# Patient Record
Sex: MALE | Race: WHITE | NOT HISPANIC OR LATINO
[De-identification: names, ages, dates, MRNs, and addresses within clinical notes are randomized per-mention and may not be internally consistent; named-entity substitution may affect disease eponyms.]

---

## 2019-03-03 ENCOUNTER — RX RENEWAL (OUTPATIENT)
Age: 79
End: 2019-03-03

## 2019-05-01 ENCOUNTER — APPOINTMENT (OUTPATIENT)
Dept: INTERNAL MEDICINE | Facility: CLINIC | Age: 79
End: 2019-05-01
Payer: MEDICARE

## 2019-05-01 ENCOUNTER — NON-APPOINTMENT (OUTPATIENT)
Age: 79
End: 2019-05-01

## 2019-05-01 VITALS
DIASTOLIC BLOOD PRESSURE: 74 MMHG | HEIGHT: 69 IN | WEIGHT: 175 LBS | BODY MASS INDEX: 25.92 KG/M2 | RESPIRATION RATE: 16 BRPM | SYSTOLIC BLOOD PRESSURE: 126 MMHG

## 2019-05-01 DIAGNOSIS — Z86.79 PERSONAL HISTORY OF OTHER DISEASES OF THE CIRCULATORY SYSTEM: ICD-10-CM

## 2019-05-01 PROCEDURE — 93000 ELECTROCARDIOGRAM COMPLETE: CPT

## 2019-05-01 PROCEDURE — G0439: CPT

## 2019-05-01 PROCEDURE — 36415 COLL VENOUS BLD VENIPUNCTURE: CPT

## 2019-05-01 PROCEDURE — G0444 DEPRESSION SCREEN ANNUAL: CPT | Mod: 59

## 2019-05-01 RX ORDER — MULTIVIT-MIN/FOLIC/VIT K/LYCOP 400-300MCG
25 MCG TABLET ORAL
Refills: 0 | Status: ACTIVE | COMMUNITY

## 2019-05-01 RX ORDER — LEVALBUTEROL TARTRATE 45 UG/1
45 AEROSOL, METERED ORAL
Refills: 0 | Status: ACTIVE | COMMUNITY

## 2019-05-01 NOTE — HISTORY OF PRESENT ILLNESS
[de-identified] : or cpx--some leg edeam--seeing noted specialists--wants ptx for gait training--some intermittent leg edema

## 2019-05-01 NOTE — HEALTH RISK ASSESSMENT
[Very Good] : ~his/her~  mood as very good [No falls in past year] : Patient reported no falls in the past year [0] : 1) Little interest or pleasure doing things: Not at all (0) [With Significant Other] : lives with significant other [] :  [Fully functional (bathing, dressing, toileting, transferring, walking, feeding)] : Fully functional (bathing, dressing, toileting, transferring, walking, feeding) [Fully functional (using the telephone, shopping, preparing meals, housekeeping, doing laundry, using] : Fully functional and needs no help or supervision to perform IADLs (using the telephone, shopping, preparing meals, housekeeping, doing laundry, using transportation, managing medications and managing finances) [Reports normal functional visual acuity (ie: able to read med bottle)] : Reports normal functional visual acuity [] : No [de-identified] : pulm, neuro, derm [[id number]] : 0 [EyeExamDate] : 04/18 [Change in mental status noted] : No change in mental status noted [Reports changes in hearing] : Reports no changes in hearing [Reports changes in vision] : Reports no changes in vision [Reports changes in dental health] : Reports no changes in dental health

## 2019-05-01 NOTE — PLAN
[FreeTextEntry1] : neuro labs reviewed from 4/19\par supplemental labs\par venous compression stoickings\par 6 mo f/u

## 2019-05-01 NOTE — PHYSICAL EXAM

## 2019-05-07 LAB
APPEARANCE: CLEAR
BILIRUBIN URINE: NEGATIVE
BLOOD URINE: NEGATIVE
CHOLEST SERPL-MCNC: 231 MG/DL
CHOLEST/HDLC SERPL: 3.9 RATIO
COLOR: NORMAL
ESTIMATED AVERAGE GLUCOSE: 126 MG/DL
GLUCOSE QUALITATIVE U: NEGATIVE
HBA1C MFR BLD HPLC: 6 %
HDLC SERPL-MCNC: 60 MG/DL
KETONES URINE: NEGATIVE
LDLC SERPL CALC-MCNC: 147 MG/DL
LEUKOCYTE ESTERASE URINE: NEGATIVE
NITRITE URINE: NEGATIVE
PH URINE: 7
PROTEIN URINE: NEGATIVE
PSA SERPL-MCNC: 1.05 NG/ML
SPECIFIC GRAVITY URINE: 1.01
TRIGL SERPL-MCNC: 121 MG/DL
TSH SERPL-ACNC: 3.49 UIU/ML
UROBILINOGEN URINE: NORMAL

## 2019-08-05 ENCOUNTER — RX RENEWAL (OUTPATIENT)
Age: 79
End: 2019-08-05

## 2019-08-21 ENCOUNTER — RX RENEWAL (OUTPATIENT)
Age: 79
End: 2019-08-21

## 2019-09-09 ENCOUNTER — APPOINTMENT (OUTPATIENT)
Dept: INTERNAL MEDICINE | Facility: CLINIC | Age: 79
End: 2019-09-09
Payer: MEDICARE

## 2019-09-09 VITALS
OXYGEN SATURATION: 98 % | HEIGHT: 68 IN | RESPIRATION RATE: 16 BRPM | DIASTOLIC BLOOD PRESSURE: 72 MMHG | SYSTOLIC BLOOD PRESSURE: 124 MMHG | WEIGHT: 173 LBS | BODY MASS INDEX: 26.22 KG/M2

## 2019-09-09 PROCEDURE — 99213 OFFICE O/P EST LOW 20 MIN: CPT

## 2019-09-09 NOTE — PHYSICAL EXAM
[No Acute Distress] : no acute distress [Well Nourished] : well nourished [Well Developed] : well developed [Well-Appearing] : well-appearing [No Respiratory Distress] : no respiratory distress  [No Accessory Muscle Use] : no accessory muscle use [Clear to Auscultation] : lungs were clear to auscultation bilaterally [Normal Rate] : normal rate  [Regular Rhythm] : with a regular rhythm [Normal S1, S2] : normal S1 and S2 [No Murmur] : no murmur heard [No Carotid Bruits] : no carotid bruits [No Varicosities] : no varicosities [Pedal Pulses Present] : the pedal pulses are present [No Edema] : there was no peripheral edema [No Extremity Clubbing/Cyanosis] : no extremity clubbing/cyanosis [No Rash] : no rash [Coordination Grossly Intact] : coordination grossly intact [Normal Gait] : normal gait [No Focal Deficits] : no focal deficits [Deep Tendon Reflexes (DTR)] : deep tendon reflexes were 2+ and symmetric [Normal Affect] : the affect was normal [Normal Insight/Judgement] : insight and judgment were intact

## 2019-09-09 NOTE — HISTORY OF PRESENT ILLNESS
[Spouse] : spouse [FreeTextEntry1] : f/u HTN [de-identified] : 80 yo male bib wife with readings of high blood pressure over the weekend. pt wife states that readings were in the 150s/80s on every reading. On Saturday, she increased amlodipine to 5 mg BID and readings went down to 130/70s within 24 hours, denies any cp, palpitations, edema, ha, blurred vision

## 2019-12-02 ENCOUNTER — APPOINTMENT (OUTPATIENT)
Dept: INTERNAL MEDICINE | Facility: CLINIC | Age: 79
End: 2019-12-02
Payer: MEDICARE

## 2019-12-02 VITALS — SYSTOLIC BLOOD PRESSURE: 126 MMHG | DIASTOLIC BLOOD PRESSURE: 80 MMHG

## 2019-12-02 VITALS
RESPIRATION RATE: 18 BRPM | SYSTOLIC BLOOD PRESSURE: 160 MMHG | WEIGHT: 179 LBS | BODY MASS INDEX: 27.13 KG/M2 | DIASTOLIC BLOOD PRESSURE: 76 MMHG | HEIGHT: 68 IN

## 2019-12-02 PROCEDURE — 99214 OFFICE O/P EST MOD 30 MIN: CPT

## 2019-12-02 NOTE — REVIEW OF SYSTEMS
[Lower Ext Edema] : lower extremity edema [Negative] : Respiratory [Fever] : no fever [Chills] : no chills [Chest Pain] : no chest pain [Orthopena] : no orthopnea [Headache] : no headache [Fainting] : no fainting [Dizziness] : no dizziness

## 2019-12-02 NOTE — PHYSICAL EXAM
[No Acute Distress] : no acute distress [Normal] : normal rate, regular rhythm, normal S1 and S2 and no murmur heard [Nonpitting Edema] : nonpitting edema present

## 2019-12-02 NOTE — HISTORY OF PRESENT ILLNESS
[Spouse] : spouse [Hypertension] : Hypertension [Checks BP Sporadically] : The patient checks ~his/her~ blood pressure sporadically [120/80] : Target blood pressure is 120/80 [Target goal met] : BP target goal met [FreeTextEntry6] : 1. Seizure d/o: Stable; on AED\par 2. COPD: Controlled; no recent exacerbations\par Denies cough/congestion; UTD w/ flu vaccine\par 3. Venous insufficiency: Unable to tolerate compression stockings; notes slight worsening after increase in norvasc\par \par

## 2019-12-23 ENCOUNTER — NON-APPOINTMENT (OUTPATIENT)
Age: 79
End: 2019-12-23

## 2019-12-23 ENCOUNTER — APPOINTMENT (OUTPATIENT)
Dept: INTERNAL MEDICINE | Facility: CLINIC | Age: 79
End: 2019-12-23
Payer: MEDICARE

## 2019-12-23 VITALS
DIASTOLIC BLOOD PRESSURE: 68 MMHG | SYSTOLIC BLOOD PRESSURE: 164 MMHG | HEIGHT: 68 IN | OXYGEN SATURATION: 86 % | RESPIRATION RATE: 24 BRPM | BODY MASS INDEX: 27.13 KG/M2 | TEMPERATURE: 98.2 F | HEART RATE: 82 BPM | WEIGHT: 179 LBS

## 2019-12-23 VITALS — OXYGEN SATURATION: 95 %

## 2019-12-23 DIAGNOSIS — J44.1 CHRONIC OBSTRUCTIVE PULMONARY DISEASE WITH (ACUTE) EXACERBATION: ICD-10-CM

## 2019-12-23 DIAGNOSIS — R06.09 OTHER FORMS OF DYSPNEA: ICD-10-CM

## 2019-12-23 PROCEDURE — 99214 OFFICE O/P EST MOD 30 MIN: CPT | Mod: 25

## 2019-12-23 PROCEDURE — 94640 AIRWAY INHALATION TREATMENT: CPT

## 2019-12-23 PROCEDURE — 93000 ELECTROCARDIOGRAM COMPLETE: CPT

## 2019-12-23 NOTE — PLAN
[FreeTextEntry1] : Resolution of expiratory wheeze s/p neb tx\par Start steroid taper\par Advised if worsening dyspnea, CP or use of accessory muscle to seek immediate ER evaluation & if no improvement post steroids consider cardiology evaluation

## 2019-12-23 NOTE — HISTORY OF PRESENT ILLNESS
[Spouse] : spouse [FreeTextEntry8] : CC: Dyspnea on exertion\par \par Here w/ spouse who is providing majority of history.\par \par 78 y/o m. hx of COPD presenting w/ complaint of dyspnea on exertion.  States saw pulmonary 12/3, where O2 was 89%.  Last week they contacted his pulmonologist stating that he was having dyspnea with activity & cough and prescribed zpak.  His last dose was today. Wife notes improvement in cough. \par Per wife, does usually sleep with a few pillows but remains at his baseline. LE edema-chronic.\par  No associated fever, chills, or CP noted.\par

## 2019-12-23 NOTE — PHYSICAL EXAM
[No Acute Distress] : no acute distress [No Lymphadenopathy] : no lymphadenopathy [Exp Wheezing] : expiratory wheezing was heard [No Accessory Muscle Use] : no accessory muscle use [Normal] : normal rate, regular rhythm, normal S1 and S2 and no murmur heard [de-identified] : Chronic LE edema

## 2020-01-31 ENCOUNTER — APPOINTMENT (OUTPATIENT)
Dept: INTERNAL MEDICINE | Facility: CLINIC | Age: 80
End: 2020-01-31
Payer: MEDICARE

## 2020-01-31 VITALS
HEIGHT: 68 IN | HEART RATE: 74 BPM | WEIGHT: 170 LBS | OXYGEN SATURATION: 92 % | BODY MASS INDEX: 25.76 KG/M2 | TEMPERATURE: 94.5 F | RESPIRATION RATE: 18 BRPM

## 2020-01-31 VITALS — SYSTOLIC BLOOD PRESSURE: 120 MMHG | DIASTOLIC BLOOD PRESSURE: 76 MMHG

## 2020-01-31 PROCEDURE — 99214 OFFICE O/P EST MOD 30 MIN: CPT

## 2020-01-31 NOTE — HISTORY OF PRESENT ILLNESS
[FreeTextEntry1] : f/u hosp visit [Family Member] : family member [de-identified] : Admitted to [facility] (observation) with right arm weakness , aphasia and gen weakness-acute stroke ruled out --?seizure?--had brain imaging, swallow study

## 2020-02-10 ENCOUNTER — RX RENEWAL (OUTPATIENT)
Age: 80
End: 2020-02-10

## 2020-06-22 ENCOUNTER — RX RENEWAL (OUTPATIENT)
Age: 80
End: 2020-06-22

## 2020-07-22 ENCOUNTER — RX RENEWAL (OUTPATIENT)
Age: 80
End: 2020-07-22

## 2020-07-27 ENCOUNTER — APPOINTMENT (OUTPATIENT)
Dept: INTERNAL MEDICINE | Facility: CLINIC | Age: 80
End: 2020-07-27
Payer: MEDICARE

## 2020-07-27 VITALS
HEART RATE: 75 BPM | TEMPERATURE: 97.6 F | RESPIRATION RATE: 16 BRPM | WEIGHT: 179 LBS | BODY MASS INDEX: 27.13 KG/M2 | OXYGEN SATURATION: 89 % | HEIGHT: 68 IN

## 2020-07-27 VITALS — DIASTOLIC BLOOD PRESSURE: 78 MMHG | SYSTOLIC BLOOD PRESSURE: 126 MMHG

## 2020-07-27 PROCEDURE — 99214 OFFICE O/P EST MOD 30 MIN: CPT

## 2020-07-27 NOTE — HISTORY OF PRESENT ILLNESS
[Family Member] : family member [FreeTextEntry1] : f/u\par seen with wife [de-identified] : reviewed meds\par had labs (not on chart) and f/u by neuro 7/20\par no new neuro symps\par stable adl's

## 2020-07-28 ENCOUNTER — RX RENEWAL (OUTPATIENT)
Age: 80
End: 2020-07-28

## 2020-10-26 ENCOUNTER — RX RENEWAL (OUTPATIENT)
Age: 80
End: 2020-10-26

## 2020-11-09 ENCOUNTER — APPOINTMENT (OUTPATIENT)
Dept: INTERNAL MEDICINE | Facility: CLINIC | Age: 80
End: 2020-11-09
Payer: MEDICARE

## 2020-11-09 VITALS
TEMPERATURE: 97.7 F | HEART RATE: 82 BPM | WEIGHT: 180.25 LBS | HEIGHT: 68 IN | OXYGEN SATURATION: 89 % | BODY MASS INDEX: 27.32 KG/M2

## 2020-11-09 VITALS — DIASTOLIC BLOOD PRESSURE: 68 MMHG | SYSTOLIC BLOOD PRESSURE: 124 MMHG

## 2020-11-09 PROCEDURE — 99214 OFFICE O/P EST MOD 30 MIN: CPT

## 2020-11-09 NOTE — HISTORY OF PRESENT ILLNESS
[No Pertinent Cardiac History] : no history of aortic stenosis, atrial fibrillation, coronary artery disease, recent myocardial infarction, or implantable device/pacemaker [COPD] : COPD [No Adverse Anesthesia Reaction] : no adverse anesthesia reaction in self or family member [(Patient denies any chest pain, claudication, dyspnea on exertion, orthopnea, palpitations or syncope)] : Patient denies any chest pain, claudication, dyspnea on exertion, orthopnea, palpitations or syncope [Moderate (4-6 METs)] : Moderate (4-6 METs) [Chronic Anticoagulation] : no chronic anticoagulation [Chronic Kidney Disease] : no chronic kidney disease [Diabetes] : no diabetes [FreeTextEntry1] : cataract OD [FreeTextEntry2] : 11/17/20 [FreeTextEntry3] : Carmen [FreeTextEntry4] : mild mod copd with no severe symps

## 2020-11-09 NOTE — PHYSICAL EXAM
2019       Gerson Bhatti, [address] [facility] [id number] [facility] In Basket      Patient: Ian Lock   YOB: 1944   Date of Visit: 2019       Dear Dr. Charlotte Tristan: Thank you for referring Dayana Salmeron to me for evaluation. Below are my notes for this visit with him. If you have questions, please do not hesitate to call me. I look forward to following your patient along with you. Sincerely,        Marcia Ruby MD        CC: No Recipients  Marcia Ruby MD  2019 10:06 AM  Sign when Signing Visit  [address] OFFICE VISIT      Patient: Ian Lock Date of Service: 2019   : 1944      PCP: Gerson Bhatti MD     CHIEF COMPLAINT:    Chief Complaint   Patient presents with   â¢ Consultation     PSVT   â¢ Cardiomyopathy       HISTORY OF PRESENT ILLNESS   Ian Lock is a 66year old male with nonischemic cardiomyopathy, ESRD on HD, hx RUE DVT, COPD, DM seen in the office today for evaluation. He was recently seen by Dr. Eveline George and was noted to have SVT. He was sent to [facility] and rhythm terminated with adenosine. He had never previously had any episodes of SVT, and has not had any recurrence since that time. No meds were adjusted at that time. He has had a persistently depressed ejection fraction around 25-30% for greater than 3 months, confirmed on most recent echo 2019. He gets short of breath with mild exertion. Denies any orthopnea, PND, or lower extremity edema. REVIEW OF SYSTEMS   Constitutional: Negative for fatigue, change in activity level or change in weight. Skin: Negative for edema, pallor, rashes or open wounds. HEENT: Negative for visual changes, epistaxis or headaches. Respiratory: Negative for cough, orthopnea, paroxsymal nocturnal dyspnea, wheezing or shortness of breath with or without exertion.     Cardiovascular: Negative for exertional chest discomfort, chest pressure, palpitations or diaphoresis. Negative for lightheadedness or dizziness. Negative for near syncope or syncope. Negative for intermittent leg claudication. Gastrointestinal: Negative for abdominal pain. Genitourinary: Negative for hematuria. Extremities:  Negative for edema, petechiae or clubbing. Neuro:  Negative for lightheadedness, dizziness or syncope. Endocrine: Negative for weight loss or weight gain. Hematological: Negative for bleeding or brusing. Psych: Negative for change in affect, change in mentation or sleep disturbance. All other systems are reviewed and are negative except as documented in the HPI. HISTORIES     ALLERGIES:   Allergen Reactions   â¢ Seafood   (Food) RASH   â¢ Shellfish-Derived Products   (Food Or Med) RASH     Current Outpatient Medications   Medication Sig Dispense Refill   â¢ warfarin (COUMADIN) 5 MG tablet Indications: Blood Clot Take 1 tab on Tu/Th and 1.5 tabs (7.5MG) all other days (BLOOD THINNER) 115 tablet 2   â¢ albuterol (PROAIR HFA) 108 (90 Base) MCG/ACT inhaler Inhale 1 puff into the lungs every 4 to 6 hours as needed for Shortness of Breath or Wheezing. for shortness of breath or wheezing 1 Inhaler 2   â¢ metoPROLOL succinate (TOPROL-XL) 50 MG 24 hr tablet Take 1/2 tablet on dialysis days. Take 1 full tablet on non-dialysis days. To strengthen the heart and control the heart rate. 90 tablet 0   â¢ umeclidinium-vilanterol (ANORO ELLIPTA) 62.5-25 MCG/INH inhaler Inhale 1 puff into the lungs daily. Indications: Chronic Obstructive Lung Disease 1 each 5   â¢ lisinopril (ZESTRIL) 2.5 MG tablet Take 1 tablet by mouth daily. To strengthen the heart (heart failure). 90 tablet 0   â¢ rosuvastatin (CRESTOR) 5 MG tablet Take 1 tablet by mouth daily. Indications: coronary artery disease 90 tablet 3   â¢ traZODone (DESYREL) 50 MG tablet Take 0.5 tablets by mouth nightly. 30 tablet 5   â¢ sevelamer carbonate (RENVELA) 800 MG tablet Take 2,400 mg by mouth 3 times daily (with meals).  Indications: "High Amount of Phosphate in the Blood     â¢ methIMAzole (TAPAZOLE) 5 MG tablet Take 1 tablet by mouth daily. 90 tablet 1     No current facility-administered medications for this visit. History reviewed. No pertinent past medical history. Past Surgical History:   Procedure Laterality Date   â¢ Partial removal of kidney       Social History     Tobacco Use   â¢ Smoking status: Former Smoker     Packs/day: 0.00     Types: Cigarettes     Last attempt to quit: 2018     Years since quittin.2   â¢ Smokeless tobacco: Former User   Substance Use Topics   â¢ Alcohol use: No     Frequency: Never   â¢ Drug use: No     Family History   Family history unknown: Yes       I have reviewed the past medical history, family history, social history, medications and allergies listed in the medical record as obtained by my nursing staff and support staff and agree with their documentation. PHYSICAL EXAM   Vital Signs:    Vitals:    19 0929   BP: 120/62   Pulse: 78   Weight: 74.8 kg (165 lb)   Height: 5' 8"" (1.727 m)     General:   Alert, cooperative, conversive in no acute distress. Skin:  Warm and dry without rash. Head:  Normocephalic-atraumatic. Neck:  Trachea is midline. No adenopathy. Normal thyroid without mass or tenderness. Eyes:  Normal conjunctiva and sclera. Cardiovascular: regular rate and rhythm, S1, S2 normal, no murmur, click, rub or gallop  Respiratory: clear to auscultation bilaterally  Gastrointestinal:  Soft and nontender. Normal bowel sounds. No hepatomegaly or splenomegaly. Extremities:  extremities normal, atraumatic, no cyanosis or edema  Neuro:   No focal deficits  Psychiatric:   Cooperative. Appropriate mood & affect. LABORATORY DATA   All pertinent laboratory results were reviewed. DIAGNOSTIC IMAGING   All pertinent diagnostic imaging reviewed. EKG (visualized and independently interpreted): sinus rhythm, RBBB with  ms. Echo 19:  1. Left ventricle:  The cavity " size is mildly dilated. Wall thickness is     mildly increased. There is concentric hypertrophy. Systolic function is     moderately to severely reduced. The estimated ejection fraction is     25-30%, by visual assessment. Doppler parameters are consistent with     abnormal left ventricular relaxation (grade 1 diastolic dysfunction). 2. Mitral valve: Moderate regurgitation. 3. Left atrium: The atrium is mildly dilated. 4. Tricuspid valve: Moderate regurgitation. ASSESSMENT & PLAN     Huan Pantoja was seen today for consultation and cardiomyopathy. Diagnoses and all orders for this visit:    Nonischemic cardiomyopathy (CMS/HCC)  -     ELECTROCARDIOGRAM 12-LEAD; Future    PSVT (paroxysmal supraventricular tachycardia) (CMS/HCC)  -     ELECTROCARDIOGRAM 12-LEAD; Future    Coronary artery disease involving native coronary artery of native heart without angina pectoris    Internal jugular (IJ) vein thromboembolism, chronic, right (CMS/HCC)    ESRD (end stage renal disease) on dialysis (CMS/HCC)    Thyroid goiter    Chronic obstructive pulmonary disease, unspecified COPD type (CMS/HCC)    Patient with nonischemic cardiomyopathy, EF 25-30%, NYHA III functional status with QRS duration over 150 ms. Meets indications for biv-icd implant for primary prevention of sudden cardiac death and resynchronization therapy. Has AV fistula on left and has had an HD catheter on the right in the past. Recommend patient undergo RUE venogram prior to prep in order to see if right subclavian is patent. If not, will have no choice but to implant on the left. The risks, benefits, alternatives to the procedure were discussed with the patient in detail. Risks include, but are not limited to: bleeding, infection, vascular injury, pneumothorax, cardiac perforation and tamponade, heart attack, stroke and death. The patient understands and would like to proceed. All questions were answered.  The [place] Shared Decision Aid Tool was used in our discussion and literature was provided. Has only had 1 episode of SVT, asymptomatic. Continue beta blocker for now. If he has recurrent SVT, then ablation would be recommended at that time. Return for biv implant. Instructions provided as documented in the after visit summary. The patient indicated understanding of the diagnosis and agreed with the plan of care. [Normal] : soft, non-tender, non-distended, no masses palpated, no HSM and normal bowel sounds

## 2021-01-18 ENCOUNTER — RX RENEWAL (OUTPATIENT)
Age: 81
End: 2021-01-18

## 2021-02-11 ENCOUNTER — RX RENEWAL (OUTPATIENT)
Age: 81
End: 2021-02-11

## 2021-03-23 ENCOUNTER — APPOINTMENT (OUTPATIENT)
Dept: INTERNAL MEDICINE | Facility: CLINIC | Age: 81
End: 2021-03-23
Payer: MEDICARE

## 2021-03-23 ENCOUNTER — NON-APPOINTMENT (OUTPATIENT)
Age: 81
End: 2021-03-23

## 2021-03-23 VITALS
HEIGHT: 68 IN | HEART RATE: 69 BPM | SYSTOLIC BLOOD PRESSURE: 130 MMHG | RESPIRATION RATE: 17 BRPM | WEIGHT: 175 LBS | TEMPERATURE: 97.8 F | OXYGEN SATURATION: 97 % | BODY MASS INDEX: 26.52 KG/M2 | DIASTOLIC BLOOD PRESSURE: 80 MMHG

## 2021-03-23 DIAGNOSIS — Z20.822 CONTACT WITH AND (SUSPECTED) EXPOSURE TO COVID-19: ICD-10-CM

## 2021-03-23 PROCEDURE — 36415 COLL VENOUS BLD VENIPUNCTURE: CPT

## 2021-03-23 PROCEDURE — 99213 OFFICE O/P EST LOW 20 MIN: CPT | Mod: CS,25

## 2021-03-23 PROCEDURE — G0439: CPT

## 2021-03-23 PROCEDURE — 82270 OCCULT BLOOD FECES: CPT

## 2021-03-23 PROCEDURE — 93000 ELECTROCARDIOGRAM COMPLETE: CPT | Mod: 59

## 2021-03-23 NOTE — ASSESSMENT
[FreeTextEntry1] : specialty f/u\par labs/ecg\par I advised the pt to get the Covid vaccine\par 6 months\par wife requests covid ab be done

## 2021-03-23 NOTE — HISTORY OF PRESENT ILLNESS
[Family Member] : family member [FreeTextEntry1] : cpx [de-identified] : cpx--seen with wife\par seeing pulm (Saturnino), Neuro (Radha), Rheum (Ana)--stable pulm and neuro symps--no seizure\par reviewed meds

## 2021-03-23 NOTE — HEALTH RISK ASSESSMENT
[0] : 2) Feeling down, depressed, or hopeless: Not at all (0) [With Significant Other] : lives with significant other [] :  [Fully functional (bathing, dressing, toileting, transferring, walking, feeding)] : Fully functional (bathing, dressing, toileting, transferring, walking, feeding) [Fully functional (using the telephone, shopping, preparing meals, housekeeping, doing laundry, using] : Fully functional and needs no help or supervision to perform IADLs (using the telephone, shopping, preparing meals, housekeeping, doing laundry, using transportation, managing medications and managing finances) [With Patient/Caregiver] : With Patient/Caregiver [[id number]] : 0 [AdvancecareDate] : 3/21

## 2021-03-23 NOTE — PHYSICAL EXAM
[No Acute Distress] : no acute distress [Well Nourished] : well nourished [Well Developed] : well developed [Well-Appearing] : well-appearing [Normal Sclera/Conjunctiva] : normal sclera/conjunctiva [PERRL] : pupils equal round and reactive to light [EOMI] : extraocular movements intact [Normal Outer Ear/Nose] : the outer ears and nose were normal in appearance [Normal Oropharynx] : the oropharynx was normal [No JVD] : no jugular venous distention [No Lymphadenopathy] : no lymphadenopathy [Supple] : supple [Thyroid Normal, No Nodules] : the thyroid was normal and there were no nodules present [No Respiratory Distress] : no respiratory distress  [No Accessory Muscle Use] : no accessory muscle use [Clear to Auscultation] : lungs were clear to auscultation bilaterally [Normal Rate] : normal rate  [Regular Rhythm] : with a regular rhythm [Normal S1, S2] : normal S1 and S2 [No Murmur] : no murmur heard [No Carotid Bruits] : no carotid bruits [No Abdominal Bruit] : a ~M bruit was not heard ~T in the abdomen [No Varicosities] : no varicosities [Pedal Pulses Present] : the pedal pulses are present [No Edema] : there was no peripheral edema [No Palpable Aorta] : no palpable aorta [No Extremity Clubbing/Cyanosis] : no extremity clubbing/cyanosis [Soft] : abdomen soft [Non Tender] : non-tender [Non-distended] : non-distended [No Masses] : no abdominal mass palpated [No HSM] : no HSM [Normal Bowel Sounds] : normal bowel sounds [Normal Sphincter Tone] : normal sphincter tone [No Mass] : no mass [Prostate Enlargement] : the prostate was not enlarged [Normal Posterior Cervical Nodes] : no posterior cervical lymphadenopathy [Normal Anterior Cervical Nodes] : no anterior cervical lymphadenopathy [No CVA Tenderness] : no CVA  tenderness [No Spinal Tenderness] : no spinal tenderness [No Joint Swelling] : no joint swelling [Grossly Normal Strength/Tone] : grossly normal strength/tone [No Rash] : no rash [Coordination Grossly Intact] : coordination grossly intact [No Focal Deficits] : no focal deficits [Normal Gait] : normal gait [Deep Tendon Reflexes (DTR)] : deep tendon reflexes were 2+ and symmetric [Normal Affect] : the affect was normal [Normal Insight/Judgement] : insight and judgment were intact [Stool Occult Blood] : stool negative for occult blood

## 2021-03-24 LAB
25(OH)D3 SERPL-MCNC: 48.4 NG/ML
ALBUMIN SERPL ELPH-MCNC: 4.6 G/DL
ALP BLD-CCNC: 69 U/L
ALT SERPL-CCNC: 21 U/L
ANION GAP SERPL CALC-SCNC: 14 MMOL/L
APPEARANCE: CLEAR
AST SERPL-CCNC: 17 U/L
BACTERIA: NEGATIVE
BASOPHILS # BLD AUTO: 0.07 K/UL
BASOPHILS NFR BLD AUTO: 1.1 %
BILIRUB SERPL-MCNC: 0.5 MG/DL
BILIRUBIN URINE: NEGATIVE
BLOOD URINE: NEGATIVE
BUN SERPL-MCNC: 14 MG/DL
CALCIUM SERPL-MCNC: 9.4 MG/DL
CHLORIDE SERPL-SCNC: 101 MMOL/L
CHOLEST SERPL-MCNC: 251 MG/DL
CO2 SERPL-SCNC: 26 MMOL/L
COLOR: YELLOW
COVID-19 SPIKE DOMAIN ANTIBODY INTERPRETATION: NEGATIVE
CREAT SERPL-MCNC: 0.73 MG/DL
EOSINOPHIL # BLD AUTO: 0.36 K/UL
EOSINOPHIL NFR BLD AUTO: 5.7 %
ESTIMATED AVERAGE GLUCOSE: 117 MG/DL
GLUCOSE QUALITATIVE U: NEGATIVE
GLUCOSE SERPL-MCNC: 90 MG/DL
HBA1C MFR BLD HPLC: 5.7 %
HCT VFR BLD CALC: 51.5 %
HDLC SERPL-MCNC: 61 MG/DL
HGB BLD-MCNC: 16.6 G/DL
HYALINE CASTS: 0 /LPF
IMM GRANULOCYTES NFR BLD AUTO: 0.2 %
KETONES URINE: NEGATIVE
LDLC SERPL CALC-MCNC: 167 MG/DL
LEUKOCYTE ESTERASE URINE: NEGATIVE
LYMPHOCYTES # BLD AUTO: 0.84 K/UL
LYMPHOCYTES NFR BLD AUTO: 13.3 %
MAN DIFF?: NORMAL
MCHC RBC-ENTMCNC: 28.9 PG
MCHC RBC-ENTMCNC: 32.2 GM/DL
MCV RBC AUTO: 89.7 FL
MICROSCOPIC-UA: NORMAL
MONOCYTES # BLD AUTO: 0.64 K/UL
MONOCYTES NFR BLD AUTO: 10.1 %
NEUTROPHILS # BLD AUTO: 4.4 K/UL
NEUTROPHILS NFR BLD AUTO: 69.6 %
NITRITE URINE: NEGATIVE
NONHDLC SERPL-MCNC: 191 MG/DL
PH URINE: 6
PLATELET # BLD AUTO: 177 K/UL
POTASSIUM SERPL-SCNC: 4.9 MMOL/L
PROT SERPL-MCNC: 7.5 G/DL
PROTEIN URINE: NORMAL
PSA SERPL-MCNC: 1.52 NG/ML
RBC # BLD: 5.74 M/UL
RBC # FLD: 15.3 %
RED BLOOD CELLS URINE: 1 /HPF
SARS-COV-2 AB SERPL IA-ACNC: 0.4 U/ML
SODIUM SERPL-SCNC: 140 MMOL/L
SPECIFIC GRAVITY URINE: 1.02
SQUAMOUS EPITHELIAL CELLS: 0 /HPF
TRIGL SERPL-MCNC: 117 MG/DL
TSH SERPL-ACNC: 3.46 UIU/ML
UROBILINOGEN URINE: NORMAL
WBC # FLD AUTO: 6.32 K/UL
WHITE BLOOD CELLS URINE: 0 /HPF

## 2021-06-18 ENCOUNTER — RX RENEWAL (OUTPATIENT)
Age: 81
End: 2021-06-18

## 2021-07-13 ENCOUNTER — RX RENEWAL (OUTPATIENT)
Age: 81
End: 2021-07-13

## 2021-08-13 ENCOUNTER — NON-APPOINTMENT (OUTPATIENT)
Age: 81
End: 2021-08-13

## 2021-08-31 ENCOUNTER — NON-APPOINTMENT (OUTPATIENT)
Age: 81
End: 2021-08-31

## 2021-08-31 RX ORDER — PREDNISONE 10 MG/1
10 TABLET ORAL
Qty: 25 | Refills: 0 | Status: COMPLETED | COMMUNITY
Start: 2019-12-23 | End: 2021-08-31

## 2021-08-31 RX ORDER — AZITHROMYCIN 250 MG/1
250 TABLET, FILM COATED ORAL
Qty: 6 | Refills: 0 | Status: COMPLETED | COMMUNITY
Start: 2019-12-19 | End: 2021-08-31

## 2021-08-31 RX ORDER — LEVETIRACETAM 1000 MG/1
1000 TABLET, FILM COATED ORAL TWICE DAILY
Refills: 0 | Status: ACTIVE | COMMUNITY

## 2021-08-31 RX ORDER — MULTIVITAMIN
TABLET ORAL
Refills: 0 | Status: COMPLETED | COMMUNITY
End: 2021-08-31

## 2021-09-02 ENCOUNTER — APPOINTMENT (OUTPATIENT)
Dept: INTERNAL MEDICINE | Facility: CLINIC | Age: 81
End: 2021-09-02
Payer: MEDICARE

## 2021-09-02 VITALS
DIASTOLIC BLOOD PRESSURE: 76 MMHG | SYSTOLIC BLOOD PRESSURE: 133 MMHG | OXYGEN SATURATION: 92 % | BODY MASS INDEX: 26.52 KG/M2 | HEIGHT: 68 IN | WEIGHT: 175 LBS | HEART RATE: 78 BPM | TEMPERATURE: 97.6 F

## 2021-09-02 DIAGNOSIS — J12.82 COVID-19: ICD-10-CM

## 2021-09-02 DIAGNOSIS — U07.1 COVID-19: ICD-10-CM

## 2021-09-02 DIAGNOSIS — R35.1 NOCTURIA: ICD-10-CM

## 2021-09-02 PROCEDURE — 99496 TRANSJ CARE MGMT HIGH F2F 7D: CPT | Mod: CS

## 2021-09-02 RX ORDER — FLUTICASONE PROPIONATE AND SALMETEROL 50; 100 UG/1; UG/1
100-50 POWDER RESPIRATORY (INHALATION)
Refills: 0 | Status: DISCONTINUED | COMMUNITY
End: 2021-09-02

## 2021-09-02 RX ORDER — TIOTROPIUM BROMIDE 18 UG/1
18 CAPSULE ORAL; RESPIRATORY (INHALATION)
Refills: 0 | Status: DISCONTINUED | COMMUNITY
End: 2021-09-02

## 2021-09-02 NOTE — HISTORY OF PRESENT ILLNESS
[Post-hospitalization from ___ Hospital] : Post-hospitalization from [unfilled] Hospital [Admitted on: ___] : The patient was admitted on [unfilled] [Discharged on ___] : discharged on [unfilled] [Discharge Med List] : discharge medication list [Med Reconciliation] : medication reconciliation has been completed [FreeTextEntry2] : seen with spouse\par meds added--metoprolol xl 25 bid, pred taper, losartan 50mg, norvasc 10mg, colchicine, symbicort, atrovent\par pt d/galina with covid pneumonia--not intubated acc to wife--no medical d/c summary\par got Regeneron --on 2lnc O2\par pt was not covid vaccinated\par pt is getting VNS and ptx services at home\par reports nocturiax3 without other urinary symps--no retention

## 2021-09-02 NOTE — PHYSICAL EXAM
[Normal] : soft, non-tender, non-distended, no masses palpated, no HSM and normal bowel sounds [de-identified] : seen in w/c [de-identified] : no bladder distentsion

## 2021-09-02 NOTE — ASSESSMENT
[FreeTextEntry1] : pt to f/u with pulmonary (Saturnino)\par 3 months f/u\par stay with current regimen--possible reduction of bp regimen depending on numbers--VNS to f/u

## 2021-09-03 ENCOUNTER — RESULT CHARGE (OUTPATIENT)
Age: 81
End: 2021-09-03

## 2021-09-03 LAB
BILIRUB UR QL STRIP: NEGATIVE
GLUCOSE UR-MCNC: NEGATIVE
HCG UR QL: 0.2 EU/DL
HGB UR QL STRIP.AUTO: NEGATIVE
KETONES UR-MCNC: NEGATIVE
LEUKOCYTE ESTERASE UR QL STRIP: NEGATIVE
NITRITE UR QL STRIP: NEGATIVE
PH UR STRIP: 5.5
PROT UR STRIP-MCNC: NEGATIVE
SP GR UR STRIP: 1.02

## 2021-09-04 LAB — BACTERIA UR CULT: NORMAL

## 2021-10-04 ENCOUNTER — APPOINTMENT (OUTPATIENT)
Dept: INTERNAL MEDICINE | Facility: CLINIC | Age: 81
End: 2021-10-04
Payer: MEDICARE

## 2021-10-04 VITALS
TEMPERATURE: 98 F | HEIGHT: 68 IN | DIASTOLIC BLOOD PRESSURE: 68 MMHG | WEIGHT: 166 LBS | OXYGEN SATURATION: 85 % | SYSTOLIC BLOOD PRESSURE: 124 MMHG | BODY MASS INDEX: 25.16 KG/M2 | HEART RATE: 80 BPM

## 2021-10-04 PROCEDURE — 99214 OFFICE O/P EST MOD 30 MIN: CPT | Mod: 25

## 2021-10-04 PROCEDURE — 36415 COLL VENOUS BLD VENIPUNCTURE: CPT

## 2021-10-04 RX ORDER — TIOTROPIUM BROMIDE 18 UG/1
18 CAPSULE ORAL; RESPIRATORY (INHALATION)
Refills: 0 | Status: ACTIVE | COMMUNITY

## 2021-10-04 RX ORDER — FLUTICASONE PROPIONATE AND SALMETEROL 50; 100 UG/1; UG/1
100-50 POWDER RESPIRATORY (INHALATION)
Refills: 0 | Status: ACTIVE | COMMUNITY

## 2021-10-04 NOTE — HISTORY OF PRESENT ILLNESS
[Spouse] : spouse [FreeTextEntry1] : f/u covid pneumonia [de-identified] : pt still getting home ptx\par DENNEY improved--to see pulm(Kathie) next wk--still on O2 prn\par occ wkness ?related to low bp?\par updated and reviewed meds

## 2021-10-05 LAB
ALBUMIN SERPL ELPH-MCNC: 4.1 G/DL
ALP BLD-CCNC: 66 U/L
ALT SERPL-CCNC: 16 U/L
ANION GAP SERPL CALC-SCNC: 11 MMOL/L
AST SERPL-CCNC: 14 U/L
BASOPHILS # BLD AUTO: 0.07 K/UL
BASOPHILS NFR BLD AUTO: 1.1 %
BILIRUB SERPL-MCNC: 0.5 MG/DL
BUN SERPL-MCNC: 13 MG/DL
CALCIUM SERPL-MCNC: 9.5 MG/DL
CHLORIDE SERPL-SCNC: 103 MMOL/L
CO2 SERPL-SCNC: 26 MMOL/L
CREAT SERPL-MCNC: 0.68 MG/DL
EOSINOPHIL # BLD AUTO: 0.21 K/UL
EOSINOPHIL NFR BLD AUTO: 3.3 %
GLUCOSE SERPL-MCNC: 91 MG/DL
HCT VFR BLD CALC: 50.7 %
HGB BLD-MCNC: 16 G/DL
IMM GRANULOCYTES NFR BLD AUTO: 0.3 %
LYMPHOCYTES # BLD AUTO: 0.67 K/UL
LYMPHOCYTES NFR BLD AUTO: 10.4 %
MAN DIFF?: NORMAL
MCHC RBC-ENTMCNC: 29.1 PG
MCHC RBC-ENTMCNC: 31.6 GM/DL
MCV RBC AUTO: 92.3 FL
MONOCYTES # BLD AUTO: 0.9 K/UL
MONOCYTES NFR BLD AUTO: 14 %
NEUTROPHILS # BLD AUTO: 4.56 K/UL
NEUTROPHILS NFR BLD AUTO: 70.9 %
PLATELET # BLD AUTO: 203 K/UL
POTASSIUM SERPL-SCNC: 4.9 MMOL/L
PROT SERPL-MCNC: 6.9 G/DL
RBC # BLD: 5.49 M/UL
RBC # FLD: 17.2 %
SODIUM SERPL-SCNC: 140 MMOL/L
WBC # FLD AUTO: 6.43 K/UL

## 2021-10-06 PROBLEM — U07.1 PNEUMONIA DUE TO COVID-19 VIRUS: Status: ACTIVE | Noted: 2021-09-02

## 2021-10-08 LAB — LEVETIRACETAM SERPL-MCNC: 43.5 UG/ML

## 2021-12-28 ENCOUNTER — RX RENEWAL (OUTPATIENT)
Age: 81
End: 2021-12-28

## 2022-03-28 ENCOUNTER — RX RENEWAL (OUTPATIENT)
Age: 82
End: 2022-03-28

## 2022-03-31 ENCOUNTER — APPOINTMENT (OUTPATIENT)
Dept: INTERNAL MEDICINE | Facility: CLINIC | Age: 82
End: 2022-03-31
Payer: MEDICARE

## 2022-03-31 VITALS
WEIGHT: 178 LBS | OXYGEN SATURATION: 92 % | HEART RATE: 72 BPM | HEIGHT: 68 IN | BODY MASS INDEX: 26.98 KG/M2 | TEMPERATURE: 97.5 F

## 2022-03-31 VITALS — DIASTOLIC BLOOD PRESSURE: 78 MMHG | SYSTOLIC BLOOD PRESSURE: 128 MMHG

## 2022-03-31 DIAGNOSIS — I65.29 OCCLUSION AND STENOSIS OF UNSPECIFIED CAROTID ARTERY: ICD-10-CM

## 2022-03-31 PROCEDURE — 99214 OFFICE O/P EST MOD 30 MIN: CPT

## 2022-03-31 RX ORDER — AMLODIPINE BESYLATE 5 MG/1
5 TABLET ORAL
Qty: 90 | Refills: 0 | Status: DISCONTINUED | COMMUNITY
Start: 2021-12-28 | End: 2022-03-31

## 2022-03-31 RX ORDER — METOPROLOL SUCCINATE 25 MG/1
25 TABLET, EXTENDED RELEASE ORAL
Qty: 60 | Refills: 0 | Status: DISCONTINUED | COMMUNITY
Start: 2021-08-31 | End: 2022-03-31

## 2022-03-31 NOTE — HISTORY OF PRESENT ILLNESS
[Spouse] : spouse [FreeTextEntry1] : f/u issues [de-identified] : on amlodipine 10mg, losartan 50mg, synthroid .075mg, keppra, xophenex, advair, spiriva\par seeing pulm, neuro (note reviewed)\par maintaining adls

## 2022-06-16 ENCOUNTER — APPOINTMENT (OUTPATIENT)
Dept: INTERNAL MEDICINE | Facility: CLINIC | Age: 82
End: 2022-06-16
Payer: MEDICARE

## 2022-06-16 ENCOUNTER — NON-APPOINTMENT (OUTPATIENT)
Age: 82
End: 2022-06-16

## 2022-06-16 VITALS — OXYGEN SATURATION: 90 % | BODY MASS INDEX: 27.13 KG/M2 | WEIGHT: 179 LBS | HEART RATE: 66 BPM | HEIGHT: 68 IN

## 2022-06-16 VITALS — DIASTOLIC BLOOD PRESSURE: 76 MMHG | SYSTOLIC BLOOD PRESSURE: 110 MMHG

## 2022-06-16 DIAGNOSIS — Z00.00 ENCOUNTER FOR GENERAL ADULT MEDICAL EXAMINATION W/OUT ABNORMAL FINDINGS: ICD-10-CM

## 2022-06-16 PROCEDURE — 99213 OFFICE O/P EST LOW 20 MIN: CPT | Mod: 25

## 2022-06-16 PROCEDURE — G0439: CPT

## 2022-06-16 PROCEDURE — 93000 ELECTROCARDIOGRAM COMPLETE: CPT

## 2022-06-16 PROCEDURE — 36415 COLL VENOUS BLD VENIPUNCTURE: CPT

## 2022-06-16 NOTE — ASSESSMENT
[FreeTextEntry1] : spec f/u\par labs\par ecg\par 6 months\par option to reduce bp rxn--pt and wife defer

## 2022-06-16 NOTE — HEALTH RISK ASSESSMENT
[No falls in past year] : Patient reported no falls in the past year [With Significant Other] : lives with significant other [] :  [Fully functional (bathing, dressing, toileting, transferring, walking, feeding)] : Fully functional (bathing, dressing, toileting, transferring, walking, feeding) [With Patient/Caregiver] : , with patient/caregiver [de-identified] : derm [AdvancecareDate] : 6/22

## 2022-06-16 NOTE — HISTORY OF PRESENT ILLNESS
[Spouse] : spouse [FreeTextEntry1] : cpx [de-identified] : seeing pulm (Saturnino) [id number] and neuro (Radha)\par same meds\par not covid vacced--advised on guidelines\par home bps ave 120's-130's/60-70's

## 2022-06-17 LAB
ALBUMIN SERPL ELPH-MCNC: 4.5 G/DL
ALP BLD-CCNC: 64 U/L
ALT SERPL-CCNC: 20 U/L
ANION GAP SERPL CALC-SCNC: 11 MMOL/L
APPEARANCE: CLEAR
AST SERPL-CCNC: 22 U/L
BACTERIA: NEGATIVE
BASOPHILS # BLD AUTO: 0.06 K/UL
BASOPHILS NFR BLD AUTO: 1.2 %
BILIRUB SERPL-MCNC: 0.5 MG/DL
BILIRUBIN URINE: NEGATIVE
BLOOD URINE: NEGATIVE
BUN SERPL-MCNC: 16 MG/DL
CALCIUM SERPL-MCNC: 9.3 MG/DL
CHLORIDE SERPL-SCNC: 101 MMOL/L
CHOLEST SERPL-MCNC: 233 MG/DL
CO2 SERPL-SCNC: 26 MMOL/L
COLOR: NORMAL
CREAT SERPL-MCNC: 0.79 MG/DL
EGFR: 88 ML/MIN/1.73M2
EOSINOPHIL # BLD AUTO: 0.24 K/UL
EOSINOPHIL NFR BLD AUTO: 4.8 %
ESTIMATED AVERAGE GLUCOSE: 123 MG/DL
GLUCOSE QUALITATIVE U: NEGATIVE
GLUCOSE SERPL-MCNC: 95 MG/DL
HBA1C MFR BLD HPLC: 5.9 %
HCT VFR BLD CALC: 50.3 %
HDLC SERPL-MCNC: 58 MG/DL
HGB BLD-MCNC: 16.3 G/DL
HYALINE CASTS: 0 /LPF
IMM GRANULOCYTES NFR BLD AUTO: 0.2 %
KETONES URINE: NEGATIVE
LDLC SERPL CALC-MCNC: 155 MG/DL
LEUKOCYTE ESTERASE URINE: NEGATIVE
LYMPHOCYTES # BLD AUTO: 0.72 K/UL
LYMPHOCYTES NFR BLD AUTO: 14.5 %
MAN DIFF?: NORMAL
MCHC RBC-ENTMCNC: 29.4 PG
MCHC RBC-ENTMCNC: 32.4 GM/DL
MCV RBC AUTO: 90.6 FL
MICROSCOPIC-UA: NORMAL
MONOCYTES # BLD AUTO: 0.48 K/UL
MONOCYTES NFR BLD AUTO: 9.7 %
NEUTROPHILS # BLD AUTO: 3.44 K/UL
NEUTROPHILS NFR BLD AUTO: 69.6 %
NITRITE URINE: NEGATIVE
NONHDLC SERPL-MCNC: 176 MG/DL
PH URINE: 6
PLATELET # BLD AUTO: 149 K/UL
POTASSIUM SERPL-SCNC: 4.7 MMOL/L
PROT SERPL-MCNC: 7.3 G/DL
PROTEIN URINE: NEGATIVE
RBC # BLD: 5.55 M/UL
RBC # FLD: 15.4 %
RED BLOOD CELLS URINE: 1 /HPF
SODIUM SERPL-SCNC: 138 MMOL/L
SPECIFIC GRAVITY URINE: 1.02
SQUAMOUS EPITHELIAL CELLS: 0 /HPF
TRIGL SERPL-MCNC: 101 MG/DL
TSH SERPL-ACNC: 2.99 UIU/ML
UROBILINOGEN URINE: NORMAL
WBC # FLD AUTO: 4.95 K/UL
WHITE BLOOD CELLS URINE: 0 /HPF

## 2022-06-24 ENCOUNTER — RX RENEWAL (OUTPATIENT)
Age: 82
End: 2022-06-24

## 2022-06-28 ENCOUNTER — NON-APPOINTMENT (OUTPATIENT)
Age: 82
End: 2022-06-28

## 2022-09-21 ENCOUNTER — RX RENEWAL (OUTPATIENT)
Age: 82
End: 2022-09-21

## 2022-10-27 ENCOUNTER — RX RENEWAL (OUTPATIENT)
Age: 82
End: 2022-10-27

## 2023-02-13 ENCOUNTER — RX RENEWAL (OUTPATIENT)
Age: 83
End: 2023-02-13

## 2023-02-15 ENCOUNTER — APPOINTMENT (OUTPATIENT)
Dept: INTERNAL MEDICINE | Facility: CLINIC | Age: 83
End: 2023-02-15
Payer: MEDICARE

## 2023-02-15 VITALS
WEIGHT: 179 LBS | TEMPERATURE: 98.1 F | HEART RATE: 77 BPM | OXYGEN SATURATION: 95 % | HEIGHT: 68 IN | BODY MASS INDEX: 27.13 KG/M2

## 2023-02-15 VITALS — DIASTOLIC BLOOD PRESSURE: 70 MMHG | SYSTOLIC BLOOD PRESSURE: 118 MMHG

## 2023-02-15 DIAGNOSIS — I87.2 VENOUS INSUFFICIENCY (CHRONIC) (PERIPHERAL): ICD-10-CM

## 2023-02-15 PROCEDURE — G0439: CPT

## 2023-02-15 NOTE — HISTORY OF PRESENT ILLNESS
[Spouse] : spouse [FreeTextEntry1] : cpx [de-identified] : labs done by neuro recently\par seeing pulm --Saturnino--only prn xophenex advair, spiriva\par home bps gen 120-130/70-80\par same meds

## 2023-02-15 NOTE — PHYSICAL EXAM
[No Acute Distress] : no acute distress [Well Nourished] : well nourished [Well Developed] : well developed [Well-Appearing] : well-appearing [Normal Sclera/Conjunctiva] : normal sclera/conjunctiva [PERRL] : pupils equal round and reactive to light [EOMI] : extraocular movements intact [Normal Outer Ear/Nose] : the outer ears and nose were normal in appearance [Normal Oropharynx] : the oropharynx was normal [No JVD] : no jugular venous distention [No Lymphadenopathy] : no lymphadenopathy [Supple] : supple [Thyroid Normal, No Nodules] : the thyroid was normal and there were no nodules present [No Respiratory Distress] : no respiratory distress  [No Accessory Muscle Use] : no accessory muscle use [Clear to Auscultation] : lungs were clear to auscultation bilaterally [Normal Rate] : normal rate  [Regular Rhythm] : with a regular rhythm [Normal S1, S2] : normal S1 and S2 [No Murmur] : no murmur heard [No Carotid Bruits] : no carotid bruits [No Abdominal Bruit] : a ~M bruit was not heard ~T in the abdomen [No Varicosities] : no varicosities [Pedal Pulses Present] : the pedal pulses are present [No Edema] : there was no peripheral edema [No Palpable Aorta] : no palpable aorta [No Extremity Clubbing/Cyanosis] : no extremity clubbing/cyanosis [Soft] : abdomen soft [Non Tender] : non-tender [Non-distended] : non-distended [No Masses] : no abdominal mass palpated [No HSM] : no HSM [Normal Bowel Sounds] : normal bowel sounds [Normal Posterior Cervical Nodes] : no posterior cervical lymphadenopathy [Normal Anterior Cervical Nodes] : no anterior cervical lymphadenopathy [No CVA Tenderness] : no CVA  tenderness [No Spinal Tenderness] : no spinal tenderness [No Joint Swelling] : no joint swelling [Grossly Normal Strength/Tone] : grossly normal strength/tone [No Rash] : no rash [Coordination Grossly Intact] : coordination grossly intact [No Focal Deficits] : no focal deficits [Normal Gait] : normal gait [Deep Tendon Reflexes (DTR)] : deep tendon reflexes were 2+ and symmetric [Normal Affect] : the affect was normal [Normal Insight/Judgement] : insight and judgment were intact [de-identified] : chronic venous insuff lower legs

## 2023-05-22 ENCOUNTER — RX RENEWAL (OUTPATIENT)
Age: 83
End: 2023-05-22

## 2023-07-07 ENCOUNTER — APPOINTMENT (OUTPATIENT)
Dept: INTERNAL MEDICINE | Facility: CLINIC | Age: 83
End: 2023-07-07
Payer: MEDICARE

## 2023-07-07 VITALS
TEMPERATURE: 98 F | WEIGHT: 175 LBS | HEART RATE: 77 BPM | BODY MASS INDEX: 26.52 KG/M2 | SYSTOLIC BLOOD PRESSURE: 133 MMHG | RESPIRATION RATE: 17 BRPM | HEIGHT: 68 IN | OXYGEN SATURATION: 87 % | DIASTOLIC BLOOD PRESSURE: 66 MMHG

## 2023-07-07 DIAGNOSIS — E78.5 HYPERLIPIDEMIA, UNSPECIFIED: ICD-10-CM

## 2023-07-07 DIAGNOSIS — R73.03 PREDIABETES.: ICD-10-CM

## 2023-07-07 DIAGNOSIS — G40.909 EPILEPSY, UNSPECIFIED, NOT INTRACTABLE, W/OUT STATUS EPILEPTICUS: ICD-10-CM

## 2023-07-07 PROCEDURE — 99214 OFFICE O/P EST MOD 30 MIN: CPT

## 2023-07-07 NOTE — HISTORY OF PRESENT ILLNESS
[Spouse] : spouse [FreeTextEntry1] : f/u bp [de-identified] : seeing neuro--will be discussing management of keppra\par home bps 120-130's/60-70's\par seeing pulm--Saturnino--stable pukm status\par same meds\par stable ADL's

## 2023-07-11 ENCOUNTER — RX RENEWAL (OUTPATIENT)
Age: 83
End: 2023-07-11

## 2023-12-08 ENCOUNTER — APPOINTMENT (OUTPATIENT)
Dept: INTERNAL MEDICINE | Facility: CLINIC | Age: 83
End: 2023-12-08
Payer: MEDICARE

## 2023-12-08 VITALS
OXYGEN SATURATION: 85 % | HEIGHT: 68 IN | TEMPERATURE: 98.1 F | BODY MASS INDEX: 26.22 KG/M2 | WEIGHT: 173 LBS | HEART RATE: 69 BPM

## 2023-12-08 VITALS — DIASTOLIC BLOOD PRESSURE: 70 MMHG | SYSTOLIC BLOOD PRESSURE: 126 MMHG

## 2023-12-08 DIAGNOSIS — I10 ESSENTIAL (PRIMARY) HYPERTENSION: ICD-10-CM

## 2023-12-08 DIAGNOSIS — E03.9 HYPOTHYROIDISM, UNSPECIFIED: ICD-10-CM

## 2023-12-08 DIAGNOSIS — J44.9 CHRONIC OBSTRUCTIVE PULMONARY DISEASE, UNSPECIFIED: ICD-10-CM

## 2023-12-08 PROCEDURE — 99213 OFFICE O/P EST LOW 20 MIN: CPT

## 2023-12-08 RX ORDER — LOSARTAN POTASSIUM 50 MG/1
50 TABLET, FILM COATED ORAL
Qty: 90 | Refills: 1 | Status: ACTIVE | COMMUNITY
Start: 2021-08-31 | End: 1900-01-01

## 2023-12-10 LAB
ANION GAP SERPL CALC-SCNC: 12 MMOL/L
BUN SERPL-MCNC: 21 MG/DL
CALCIUM SERPL-MCNC: 9.4 MG/DL
CHLORIDE SERPL-SCNC: 102 MMOL/L
CO2 SERPL-SCNC: 26 MMOL/L
CREAT SERPL-MCNC: 0.69 MG/DL
EGFR: 91 ML/MIN/1.73M2
GLUCOSE SERPL-MCNC: 112 MG/DL
POTASSIUM SERPL-SCNC: 4.5 MMOL/L
SODIUM SERPL-SCNC: 140 MMOL/L

## 2024-01-25 ENCOUNTER — RX RENEWAL (OUTPATIENT)
Age: 84
End: 2024-01-25

## 2024-01-25 RX ORDER — AMLODIPINE BESYLATE 10 MG/1
10 TABLET ORAL
Qty: 90 | Refills: 1 | Status: ACTIVE | COMMUNITY
Start: 2019-08-05 | End: 1900-01-01

## 2024-02-05 ENCOUNTER — RX RENEWAL (OUTPATIENT)
Age: 84
End: 2024-02-05

## 2024-02-05 RX ORDER — LEVOTHYROXINE SODIUM 0.07 MG/1
75 TABLET ORAL
Qty: 90 | Refills: 1 | Status: ACTIVE | COMMUNITY
Start: 2019-03-03 | End: 1900-01-01

## 2024-07-30 ENCOUNTER — APPOINTMENT (OUTPATIENT)
Dept: INTERNAL MEDICINE | Facility: CLINIC | Age: 84
End: 2024-07-30
Payer: MEDICARE

## 2024-07-30 ENCOUNTER — NON-APPOINTMENT (OUTPATIENT)
Age: 84
End: 2024-07-30

## 2024-07-30 VITALS
WEIGHT: 171 LBS | OXYGEN SATURATION: 85 % | DIASTOLIC BLOOD PRESSURE: 58 MMHG | SYSTOLIC BLOOD PRESSURE: 128 MMHG | BODY MASS INDEX: 25.91 KG/M2 | HEIGHT: 68 IN | TEMPERATURE: 97.4 F | HEART RATE: 70 BPM

## 2024-07-30 VITALS — SYSTOLIC BLOOD PRESSURE: 110 MMHG | DIASTOLIC BLOOD PRESSURE: 64 MMHG

## 2024-07-30 DIAGNOSIS — G40.909 EPILEPSY, UNSPECIFIED, NOT INTRACTABLE, W/OUT STATUS EPILEPTICUS: ICD-10-CM

## 2024-07-30 DIAGNOSIS — J44.9 CHRONIC OBSTRUCTIVE PULMONARY DISEASE, UNSPECIFIED: ICD-10-CM

## 2024-07-30 DIAGNOSIS — E03.9 HYPOTHYROIDISM, UNSPECIFIED: ICD-10-CM

## 2024-07-30 DIAGNOSIS — Z00.00 ENCOUNTER FOR GENERAL ADULT MEDICAL EXAMINATION W/OUT ABNORMAL FINDINGS: ICD-10-CM

## 2024-07-30 DIAGNOSIS — E78.5 HYPERLIPIDEMIA, UNSPECIFIED: ICD-10-CM

## 2024-07-30 DIAGNOSIS — R73.03 PREDIABETES.: ICD-10-CM

## 2024-07-30 PROCEDURE — 93000 ELECTROCARDIOGRAM COMPLETE: CPT

## 2024-07-30 PROCEDURE — G0439: CPT

## 2024-07-30 NOTE — PHYSICAL EXAM
[No Acute Distress] : no acute distress [Well Nourished] : well nourished [Well Developed] : well developed [Well-Appearing] : well-appearing [Normal Sclera/Conjunctiva] : normal sclera/conjunctiva [PERRL] : pupils equal round and reactive to light [EOMI] : extraocular movements intact [Normal Outer Ear/Nose] : the outer ears and nose were normal in appearance [Normal Oropharynx] : the oropharynx was normal [No JVD] : no jugular venous distention [No Lymphadenopathy] : no lymphadenopathy [Supple] : supple [Thyroid Normal, No Nodules] : the thyroid was normal and there were no nodules present [No Respiratory Distress] : no respiratory distress  [No Accessory Muscle Use] : no accessory muscle use [Clear to Auscultation] : lungs were clear to auscultation bilaterally [Normal Rate] : normal rate  [Regular Rhythm] : with a regular rhythm [Normal S1, S2] : normal S1 and S2 [No Murmur] : no murmur heard [No Carotid Bruits] : no carotid bruits [No Abdominal Bruit] : a ~M bruit was not heard ~T in the abdomen [No Varicosities] : no varicosities [Pedal Pulses Present] : the pedal pulses are present [No Edema] : there was no peripheral edema [No Palpable Aorta] : no palpable aorta [No Extremity Clubbing/Cyanosis] : no extremity clubbing/cyanosis [Soft] : abdomen soft [Non Tender] : non-tender [Non-distended] : non-distended [No Masses] : no abdominal mass palpated [No HSM] : no HSM [Normal Bowel Sounds] : normal bowel sounds [Normal Posterior Cervical Nodes] : no posterior cervical lymphadenopathy [Normal Anterior Cervical Nodes] : no anterior cervical lymphadenopathy [No CVA Tenderness] : no CVA  tenderness [No Spinal Tenderness] : no spinal tenderness [No Joint Swelling] : no joint swelling [Grossly Normal Strength/Tone] : grossly normal strength/tone [No Rash] : no rash [Coordination Grossly Intact] : coordination grossly intact [No Focal Deficits] : no focal deficits [Normal Gait] : normal gait [Normal Affect] : the affect was normal [Deep Tendon Reflexes (DTR)] : deep tendon reflexes were 2+ and symmetric [Normal Insight/Judgement] : insight and judgment were intact [de-identified] : chronic venous insufficiency of lower legs

## 2024-07-30 NOTE — HEALTH RISK ASSESSMENT
[No falls in past year] : Patient reported no falls in the past year [With Significant Other] : lives with significant other [] :  [With Patient/Caregiver] : , with patient/caregiver [de-identified] : [email or web address], derm [AdvancecareDate] : 7/24

## 2024-07-30 NOTE — HISTORY OF PRESENT ILLNESS
[Spouse] : spouse [FreeTextEntry1] : cpx [de-identified] : seeing neuro (Radha) and pulm (Citlalli) rare use of home O2 had labs and due again in Nov some occ dependent leg edema

## 2024-08-01 ENCOUNTER — NON-APPOINTMENT (OUTPATIENT)
Age: 84
End: 2024-08-01

## 2024-09-03 ENCOUNTER — RX RENEWAL (OUTPATIENT)
Age: 84
End: 2024-09-03

## 2024-11-18 ENCOUNTER — RX RENEWAL (OUTPATIENT)
Age: 84
End: 2024-11-18

## 2024-12-04 ENCOUNTER — RX RENEWAL (OUTPATIENT)
Age: 84
End: 2024-12-04

## 2025-01-07 ENCOUNTER — RX RENEWAL (OUTPATIENT)
Age: 85
End: 2025-01-07

## 2025-03-10 ENCOUNTER — RX RENEWAL (OUTPATIENT)
Age: 85
End: 2025-03-10

## 2025-03-11 ENCOUNTER — RX RENEWAL (OUTPATIENT)
Age: 85
End: 2025-03-11

## 2025-03-21 ENCOUNTER — APPOINTMENT (OUTPATIENT)
Dept: INTERNAL MEDICINE | Facility: CLINIC | Age: 85
End: 2025-03-21
Payer: MEDICARE

## 2025-03-21 VITALS
TEMPERATURE: 97.5 F | HEART RATE: 67 BPM | BODY MASS INDEX: 25.16 KG/M2 | WEIGHT: 166 LBS | OXYGEN SATURATION: 93 % | HEIGHT: 68 IN

## 2025-03-21 VITALS — DIASTOLIC BLOOD PRESSURE: 74 MMHG | SYSTOLIC BLOOD PRESSURE: 122 MMHG

## 2025-03-21 DIAGNOSIS — J44.9 CHRONIC OBSTRUCTIVE PULMONARY DISEASE, UNSPECIFIED: ICD-10-CM

## 2025-03-21 DIAGNOSIS — G40.909 EPILEPSY, UNSPECIFIED, NOT INTRACTABLE, W/OUT STATUS EPILEPTICUS: ICD-10-CM

## 2025-03-21 DIAGNOSIS — E78.5 HYPERLIPIDEMIA, UNSPECIFIED: ICD-10-CM

## 2025-03-21 DIAGNOSIS — E03.9 HYPOTHYROIDISM, UNSPECIFIED: ICD-10-CM

## 2025-03-21 DIAGNOSIS — I10 ESSENTIAL (PRIMARY) HYPERTENSION: ICD-10-CM

## 2025-03-21 PROCEDURE — 99213 OFFICE O/P EST LOW 20 MIN: CPT

## 2025-03-21 PROCEDURE — G2211 COMPLEX E/M VISIT ADD ON: CPT

## 2025-03-24 ENCOUNTER — RX RENEWAL (OUTPATIENT)
Age: 85
End: 2025-03-24

## 2025-05-11 ENCOUNTER — INPATIENT (INPATIENT)
Facility: HOSPITAL | Age: 85
LOS: 3 days | Discharge: HOME HEALTH | DRG: 948 | End: 2025-05-15
Attending: HOSPITALIST | Admitting: HOSPITALIST
Payer: MEDICARE

## 2025-05-11 VITALS
OXYGEN SATURATION: 97 % | SYSTOLIC BLOOD PRESSURE: 112 MMHG | DIASTOLIC BLOOD PRESSURE: 63 MMHG | HEIGHT: 68 IN | HEART RATE: 66 BPM | WEIGHT: 175.05 LBS | RESPIRATION RATE: 16 BRPM

## 2025-05-11 DIAGNOSIS — I10 ESSENTIAL (PRIMARY) HYPERTENSION: ICD-10-CM

## 2025-05-11 DIAGNOSIS — Z98.890 OTHER SPECIFIED POSTPROCEDURAL STATES: Chronic | ICD-10-CM

## 2025-05-11 DIAGNOSIS — R79.89 OTHER SPECIFIED ABNORMAL FINDINGS OF BLOOD CHEMISTRY: ICD-10-CM

## 2025-05-11 DIAGNOSIS — Z29.9 ENCOUNTER FOR PROPHYLACTIC MEASURES, UNSPECIFIED: ICD-10-CM

## 2025-05-11 DIAGNOSIS — Z98.41 CATARACT EXTRACTION STATUS, RIGHT EYE: Chronic | ICD-10-CM

## 2025-05-11 DIAGNOSIS — E03.9 HYPOTHYROIDISM, UNSPECIFIED: ICD-10-CM

## 2025-05-11 DIAGNOSIS — R09.89 OTHER SPECIFIED SYMPTOMS AND SIGNS INVOLVING THE CIRCULATORY AND RESPIRATORY SYSTEMS: ICD-10-CM

## 2025-05-11 LAB
ALBUMIN SERPL ELPH-MCNC: 3.9 G/DL — SIGNIFICANT CHANGE UP (ref 3.3–5)
ALP SERPL-CCNC: 64 U/L — SIGNIFICANT CHANGE UP (ref 40–120)
ALT FLD-CCNC: 19 U/L — SIGNIFICANT CHANGE UP (ref 10–45)
ANION GAP SERPL CALC-SCNC: 13 MMOL/L — SIGNIFICANT CHANGE UP (ref 5–17)
APTT BLD: 21.8 SEC — LOW (ref 26.1–36.8)
AST SERPL-CCNC: 24 U/L — SIGNIFICANT CHANGE UP (ref 10–40)
BASOPHILS # BLD AUTO: 0.04 K/UL — SIGNIFICANT CHANGE UP (ref 0–0.2)
BASOPHILS NFR BLD AUTO: 0.5 % — SIGNIFICANT CHANGE UP (ref 0–2)
BILIRUB SERPL-MCNC: 0.5 MG/DL — SIGNIFICANT CHANGE UP (ref 0.2–1.2)
BUN SERPL-MCNC: 20 MG/DL — SIGNIFICANT CHANGE UP (ref 7–23)
CALCIUM SERPL-MCNC: 9.1 MG/DL — SIGNIFICANT CHANGE UP (ref 8.4–10.5)
CHLORIDE SERPL-SCNC: 101 MMOL/L — SIGNIFICANT CHANGE UP (ref 96–108)
CK MB CFR SERPL CALC: 4 NG/ML — SIGNIFICANT CHANGE UP (ref 0–6.7)
CO2 SERPL-SCNC: 23 MMOL/L — SIGNIFICANT CHANGE UP (ref 22–31)
CREAT SERPL-MCNC: 0.64 MG/DL — SIGNIFICANT CHANGE UP (ref 0.5–1.3)
EGFR: 92 ML/MIN/1.73M2 — SIGNIFICANT CHANGE UP
EGFR: 92 ML/MIN/1.73M2 — SIGNIFICANT CHANGE UP
EOSINOPHIL # BLD AUTO: 0.13 K/UL — SIGNIFICANT CHANGE UP (ref 0–0.5)
EOSINOPHIL NFR BLD AUTO: 1.6 % — SIGNIFICANT CHANGE UP (ref 0–6)
GAS PNL BLDV: SIGNIFICANT CHANGE UP
GLUCOSE SERPL-MCNC: 191 MG/DL — HIGH (ref 70–99)
HCT VFR BLD CALC: 48.2 % — SIGNIFICANT CHANGE UP (ref 39–50)
HGB BLD-MCNC: 15.3 G/DL — SIGNIFICANT CHANGE UP (ref 13–17)
IMM GRANULOCYTES NFR BLD AUTO: 0.5 % — SIGNIFICANT CHANGE UP (ref 0–0.9)
INR BLD: 1.01 RATIO — SIGNIFICANT CHANGE UP (ref 0.85–1.16)
LYMPHOCYTES # BLD AUTO: 0.67 K/UL — LOW (ref 1–3.3)
LYMPHOCYTES # BLD AUTO: 8.1 % — LOW (ref 13–44)
MAGNESIUM SERPL-MCNC: 2 MG/DL — SIGNIFICANT CHANGE UP (ref 1.6–2.6)
MCHC RBC-ENTMCNC: 28.2 PG — SIGNIFICANT CHANGE UP (ref 27–34)
MCHC RBC-ENTMCNC: 31.7 G/DL — LOW (ref 32–36)
MCV RBC AUTO: 88.8 FL — SIGNIFICANT CHANGE UP (ref 80–100)
MONOCYTES # BLD AUTO: 0.7 K/UL — SIGNIFICANT CHANGE UP (ref 0–0.9)
MONOCYTES NFR BLD AUTO: 8.5 % — SIGNIFICANT CHANGE UP (ref 2–14)
NEUTROPHILS # BLD AUTO: 6.7 K/UL — SIGNIFICANT CHANGE UP (ref 1.8–7.4)
NEUTROPHILS NFR BLD AUTO: 80.8 % — HIGH (ref 43–77)
NRBC BLD AUTO-RTO: 0 /100 WBCS — SIGNIFICANT CHANGE UP (ref 0–0)
NT-PROBNP SERPL-SCNC: 150 PG/ML — SIGNIFICANT CHANGE UP (ref 0–300)
PLATELET # BLD AUTO: 146 K/UL — LOW (ref 150–400)
POTASSIUM SERPL-MCNC: 4.9 MMOL/L — SIGNIFICANT CHANGE UP (ref 3.5–5.3)
POTASSIUM SERPL-SCNC: 4.9 MMOL/L — SIGNIFICANT CHANGE UP (ref 3.5–5.3)
PROT SERPL-MCNC: 7.2 G/DL — SIGNIFICANT CHANGE UP (ref 6–8.3)
PROTHROM AB SERPL-ACNC: 11.6 SEC — SIGNIFICANT CHANGE UP (ref 9.9–13.4)
RBC # BLD: 5.43 M/UL — SIGNIFICANT CHANGE UP (ref 4.2–5.8)
RBC # FLD: 15.5 % — HIGH (ref 10.3–14.5)
SODIUM SERPL-SCNC: 137 MMOL/L — SIGNIFICANT CHANGE UP (ref 135–145)
TROPONIN T, HIGH SENSITIVITY RESULT: 18 NG/L — SIGNIFICANT CHANGE UP (ref 0–51)
TROPONIN T, HIGH SENSITIVITY RESULT: 187 NG/L — HIGH (ref 0–51)
TROPONIN T, HIGH SENSITIVITY RESULT: 27 NG/L — SIGNIFICANT CHANGE UP (ref 0–51)
TROPONIN T, HIGH SENSITIVITY RESULT: 69 NG/L — HIGH (ref 0–51)
WBC # BLD: 8.28 K/UL — SIGNIFICANT CHANGE UP (ref 3.8–10.5)
WBC # FLD AUTO: 8.28 K/UL — SIGNIFICANT CHANGE UP (ref 3.8–10.5)

## 2025-05-11 PROCEDURE — 70450 CT HEAD/BRAIN W/O DYE: CPT | Mod: 26

## 2025-05-11 PROCEDURE — 93010 ELECTROCARDIOGRAM REPORT: CPT | Mod: 76

## 2025-05-11 PROCEDURE — 71045 X-RAY EXAM CHEST 1 VIEW: CPT | Mod: 26

## 2025-05-11 PROCEDURE — 99223 1ST HOSP IP/OBS HIGH 75: CPT | Mod: GC

## 2025-05-11 PROCEDURE — 99285 EMERGENCY DEPT VISIT HI MDM: CPT | Mod: GC

## 2025-05-11 PROCEDURE — 93306 TTE W/DOPPLER COMPLETE: CPT | Mod: 26

## 2025-05-11 PROCEDURE — 99221 1ST HOSP IP/OBS SF/LOW 40: CPT

## 2025-05-11 RX ORDER — LEVOTHYROXINE SODIUM 300 MCG
75 TABLET ORAL DAILY
Refills: 0 | Status: DISCONTINUED | OUTPATIENT
Start: 2025-05-11 | End: 2025-05-15

## 2025-05-11 RX ORDER — HEPARIN SODIUM 1000 [USP'U]/ML
950 INJECTION INTRAVENOUS; SUBCUTANEOUS
Qty: 25000 | Refills: 0 | Status: DISCONTINUED | OUTPATIENT
Start: 2025-05-11 | End: 2025-05-11

## 2025-05-11 RX ORDER — ALBUTEROL SULFATE 2.5 MG/3ML
2 VIAL, NEBULIZER (ML) INHALATION EVERY 6 HOURS
Refills: 0 | Status: DISCONTINUED | OUTPATIENT
Start: 2025-05-11 | End: 2025-05-15

## 2025-05-11 RX ORDER — ENOXAPARIN SODIUM 100 MG/ML
40 INJECTION SUBCUTANEOUS EVERY 24 HOURS
Refills: 0 | Status: DISCONTINUED | OUTPATIENT
Start: 2025-05-11 | End: 2025-05-12

## 2025-05-11 RX ORDER — ENOXAPARIN SODIUM 100 MG/ML
40 INJECTION SUBCUTANEOUS EVERY 24 HOURS
Refills: 0 | Status: DISCONTINUED | OUTPATIENT
Start: 2025-05-11 | End: 2025-05-11

## 2025-05-11 RX ORDER — LEVETIRACETAM 10 MG/ML
1000 INJECTION, SOLUTION INTRAVENOUS
Refills: 0 | Status: DISCONTINUED | OUTPATIENT
Start: 2025-05-11 | End: 2025-05-15

## 2025-05-11 RX ORDER — TIOTROPIUM BROMIDE INHALATION SPRAY 3.12 UG/1
2 SPRAY, METERED RESPIRATORY (INHALATION) DAILY
Refills: 0 | Status: DISCONTINUED | OUTPATIENT
Start: 2025-05-11 | End: 2025-05-15

## 2025-05-11 RX ADMIN — Medication 2 PUFF(S): at 18:47

## 2025-05-11 RX ADMIN — ENOXAPARIN SODIUM 40 MILLIGRAM(S): 100 INJECTION SUBCUTANEOUS at 18:54

## 2025-05-11 RX ADMIN — LEVETIRACETAM 1000 MILLIGRAM(S): 10 INJECTION, SOLUTION INTRAVENOUS at 18:10

## 2025-05-11 RX ADMIN — Medication 1 DOSE(S): at 18:10

## 2025-05-11 NOTE — ED ADULT TRIAGE NOTE - CHIEF COMPLAINT QUOTE
chest pain, diaphoretic, hx CVA/head bleed --> expressive aphasia, CESAR on lead 3&4 by EMS, asa 324 mg chewable, nitro SL x 1 given with relief

## 2025-05-11 NOTE — ED PROVIDER NOTE - CLINICAL SUMMARY MEDICAL DECISION MAKING FREE TEXT BOX
Elderly male with PMHx of HTN, CVA (bleed) with expressive aphasia at baseline p/w chest pain. Sudden onset of chest pain from this morning with radiation down his left arm. Also endorses some shortness of breath. EKG with possible CESAR in II, III, aVF and V3-V4. COncern for possible STEMI. Hesitant to give brillinta and heparin drip given previous two episodes of head bleed. Will consult cardiology.

## 2025-05-11 NOTE — H&P ADULT - PROBLEM SELECTOR PLAN 1
- EKG showing CESAR in V3-V5, TTE EF 62% w/o RWMA or change in LV function but showing RV dysfunction    Plan:  - CTA chest to r/o PE pending  - monitor on tele  - trend trops until peak  - neurosx recs appreciated - no absolute contraindication to start A/C  - if CTA chest (+) for PE, start hep gtt  - once hep gtt started, start neuro checks q4h - if AMS, get stat CT head, stop hep gtt - EKG showing CESAR in V3-V5, TTE EF 62% w/o RWMA or change in LV function but showing RV dysfunction  - trops on admission 18 -> 27 -> 69    Plan:  - CTA chest to r/o PE pending  - monitor on tele  - trend trops until peak  - neurosx recs appreciated - no absolute contraindication to start A/C  - if CTA chest (+) for PE, start hep gtt  - once hep gtt started, start neuro checks q4h - if AMS, get stat CT head, stop hep gtt - EKG showing CESAR in V3-V5, TTE EF 62% w/o RWMA or change in LV function but showing RV dysfunction  - trops on admission 18 -> 27 -> 69    Plan:  - cards recs appreciated  - CTA chest to r/o PE pending  - monitor on tele  - trend trops until peak  - neurosx recs appreciated - no absolute contraindication to start A/C  - if CTA chest (+) for PE, start hep gtt  - once hep gtt started, start neuro checks q4h - if AMS, get stat CT head, stop hep gtt

## 2025-05-11 NOTE — CONSULT NOTE ADULT - ASSESSMENT
-no acute neurosurgical intervention   -no absolute neurosurgical c/i to AC for PE if indicated  -hep gtt no bolus, goal 50-70, CTH when therapeutic  -non-urgent MR w/w/o to eval for mass -no acute neurosurgical intervention   -no absolute neurosurgical c/i to AP/ AC if indicated  -hep gtt no bolus, goal 50-70, CTH when therapeutic  -non-urgent MR w/w/o to eval for mass

## 2025-05-11 NOTE — H&P ADULT - ATTENDING COMMENTS
Patient is an 84 .year old male, with PMH of HTN, right SDH s/p evac. (2006), left IPH s/p evac./hemicrani/plasty (2009) [facility] who presents for one day of chest pain.     - presented due to chest pain   - concern for CESAR on EKG  - cardio following; obtain CTA chest to r/o PE  - trend trops till peak  - monitor on tele for now   - concern for possible left frontoparietal mass on CT imaging; wife states had recent MRI done few months ago that was fine; NSGY recs appreciated; nonurgent MRI head recommended but wife wanting to defer for now; likely outpatient follow up     Discussed with wife at bedside. Patient is an 84 .year old male, with PMH of HTN, right SDH s/p evac. (2006), left IPH s/p evac./hemicrani/plasty (2009) c/b [facility] aphasia [facility] who presents for one day of chest pain.     - presented due to chest pain   - concern for CESAR on EKG  - cardio following; obtain CTA chest to r/o PE  - trend trops till peak  - monitor on tele for now   - concern for possible left frontoparietal mass on CT imaging; wife states had recent MRI done few months ago that was fine; NSGY recs appreciated; nonurgent MRI head recommended but wife wanting to defer for now; likely outpatient follow up     Discussed with wife at bedside.    Rest as above. Discussed with HS.

## 2025-05-11 NOTE — CONSULT NOTE ADULT - ASSESSMENT
84M PMH HTN, SDH s/p evaculation ('06), intracranial bleeding ('09) c/b expressive aphasia presenting with one day of left-sided chest pain radiating to L arm and SOB. Initially consulted for STEMI evaluation, EKG with CESAR in V3-V5 w/o reciprocal changes. STAT TTE w/o RWMA or change in LV function. May represent NSTEMI vs noncardiac cause of chest pain     EKG:    Echo:    Cath:    Imaging: Personally Reviewed  Telemetry:     84M PMH HTN, SDH s/p evaculation ('06), intracranial bleeding ('09) c/b expressive aphasia presenting with one day of left-sided chest pain radiating to L arm and SOB. Initially consulted for STEMI evaluation, EKG with CESAR in V3-V5 w/o reciprocal changes. STAT TTE w/o RWMA or change in LV function, notable for RV dysfunction. hsTrop wnl x2 making ischemia unlikely. Would evaluate for PE given RV dysfunction on TTE and SOB/chest pain.     EKG:  sloping CESAR in V3-V5 w/o reciprocal changes, no dynamic changes on repeat  Echo:  LVEF 62%, no RWMA, enlarged RV cavity and reduced RVSF, TAPSE 1.3. IVC 2.3cm w/ normal inspiratory collapse, est RAP 8 mmHg  Imaging: Personally Reviewed  Tele: sinus 60s    Recommendations:   - admit to tele  - CTPA to eval for PE  - repeat EKG/Trops for recurrent chest pain      Anna Pinon MD  PGY-5, Cardiology  Available on TEAMS    For all new consults  [email or web address]  Login: [email or web address]

## 2025-05-11 NOTE — CONSULT NOTE ADULT - SUBJECTIVE AND OBJECTIVE BOX
CARDIOLOGY FELLOW CONSULT NOTE    HPI:  84M PMH HTN, SDH s/p evaculation ('06), intracranial bleeding ('09) c/b expressive aphasia presenting with one day of chest pain. History obtained from patient and wife at bedside. Wife notes that this morning patient was feeling generally unwell and fatigued after using the bathroom. EMS was called and when they arrived he began to report some new-onset chest pain radiating to the left arm and shortness of breath. He received aspirin 325mg via EMS. On my interview continues to report some left-sided chest pain and mild shortness of breath.       PMHx:   HTN,   SDH s/p evaculation ('06),   intracranial bleeding ('09) c/b expressive aphasia     PSHx:       Allergies:  Nuts (Unknown)  No Known Drug Allergies  allergic to wool - unknown reaction (Unknown)      Home Meds:  Losartan  Amlodipine    Current Medications:       FAMILY HISTORY: no cardiac history       Social History:  Smoking History: denies  Alcohol Use: denies  Drug Use: denies    REVIEW OF SYSTEMS:  CONSTITUTIONAL: No weakness, fevers or chills  EYES/ENT: No visual changes;  No dysphagia  NECK: No pain or stiffness  RESPIRATORY: No cough, wheezing, hemoptysis; No shortness of breath  CARDIOVASCULAR: No chest pain or palpitations; No lower extremity edema  GASTROINTESTINAL: No abdominal or epigastric pain. No nausea, vomiting, or hematemesis; No diarrhea or constipation. No melena or hematochezia.  BACK: No back pain  GENITOURINARY: No dysuria, frequency or hematuria  NEUROLOGICAL: No numbness or weakness  SKIN: No itching, burning, rashes, or lesions   All other review of systems is negative unless indicated above.    Physical Exam:  T(F): 97.4 (05-11), Max: 97.4 (05-11)  HR: 64 (05-11) (64 - 66)  BP: 133/63 (05-11) (112/63 - 137/70)  RR: 20 (05-11)  SpO2: 95% (05-11)  GEN: comfortable appearing, lying in bed in NAD  HEENT: NCAT, MMM  CV: Regular S1, S2, no m/r/g  RESP: CTAB  ABD: Soft, NTND, +BS  EXT: No LE edema, WWP, pulses palpable throughout  NEURO: No focal deficits, AOx3  SKIN:  No rashes    Labs: Personally reviewed                        15.3   8.28  )-----------( 146      ( 11 May 2025 11:04 )             48.2     05-11    137  |  101  |  20  ----------------------------<  191[H]  4.9   |  23  |  0.64    Ca    9.1      11 May 2025 11:04  Mg     2.0     05-11    TPro  7.2  /  Alb  3.9  /  TBili  0.5  /  DBili  x   /  AST  24  /  ALT  19  /  AlkPhos  64  05-11    PT/INR - ( 11 May 2025 11:04 )   PT: 11.6 sec;   INR: 1.01 ratio         PTT - ( 11 May 2025 11:04 )  PTT:21.8 sec    CARDIAC MARKERS ( 11 May 2025 12:30 )  27 ng/L / x     / x     / x     / x     / x      CARDIAC MARKERS ( 11 May 2025 11:04 )  18 ng/L / x     / x     / x     / x     / 4.0 ng/mL                
p (1480)     HPI: 84M h/o right SDH s/p evac 2006, left IPH s/p evac/hemicrani/plasty 2009 w/ baseline expressive aphasia @[facility] p/w chest pain, c/f PE. CTH w/o acute heme, left frontal encephalomalacia c/w known IPH. Rads read as "suspicious for underlying mass". Per wife had outpatient MR three months ago w/o suspicious findings.     Exam: Awake, expressive aphasia (baseline), oriented to self and place with choices (baseline), PERRL, no facial, FC, RAMIREZ 5/5.     =====================  PAST MEDICAL HISTORY     PAST SURGICAL HISTORY     Nuts (Unknown)  No Known Drug Allergies  allergic to wool - unknown reaction (Unknown)      MEDICATIONS:  Antibiotics:    Neuro:    Other:      SOCIAL HISTORY:   Occupation:   Marital Status:     FAMILY HISTORY:      ROS: Negative except per HPI    LABS:  PT/INR - ( 11 May 2025 11:04 )   PT: 11.6 sec;   INR: 1.01 ratio         PTT - ( 11 May 2025 11:04 )  PTT:21.8 sec                        15.3   8.28  )-----------( 146      ( 11 May 2025 11:04 )             48.2     05-11    137  |  101  |  20  ----------------------------<  191[H]  4.9   |  23  |  0.64    Ca    9.1      11 May 2025 11:04  Mg     2.0     05-11    TPro  7.2  /  Alb  3.9  /  TBili  0.5  /  DBili  x   /  AST  24  /  ALT  19  /  AlkPhos  64  05-11

## 2025-05-11 NOTE — CONSULT NOTE ADULT - ATTENDING COMMENTS
84M h/o right SDH s/p evac 2006, left IPH s/p evac/hemicrani/plasty 2009 w/ baseline expressive aphasia @[facility] p/w chest pain, c/f PE. CTH w/o acute heme, left frontal encephalomalacia c/w known IPH. Rads read as "suspicious for underlying mass". Per wife had outpatient MR three months ago w/o suspicious findings.     -no absolute neurosurgical c/i to AC for PE if indicated  -hep gtt no bolus, goal 50-70, CTH when therapeutic  -MR w/w/o to eval for mass

## 2025-05-11 NOTE — ED PROVIDER NOTE - PHYSICAL EXAMINATION
PHYSICAL EXAM:  GENERAL: NAD, well-developed  HEAD:  Atraumatic, Normocephalic  EYES: EOMI, PERRL  NECK: Supple, No JVD  CHEST/LUNG: Clear to auscultation bilaterally; No wheeze  HEART: Regular rate and rhythm; No murmurs, rubs, or gallops  ABDOMEN: Soft, Nontender, Nondistended; Bowel sounds present  EXTREMITIES:  2+ Peripheral Pulses, No clubbing, cyanosis, or edema  PSYCH: AAOx1. Only answers questions with yes/no. Unable to question regarding date and location.  NEUROLOGY: non-focal

## 2025-05-11 NOTE — H&P ADULT - NSHPSOCIALHISTORY_GEN_ALL_CORE
Retired . Lives w/ wife. Denied any history of substance use, including cigarettes, alcohol, and recreational drugs.

## 2025-05-11 NOTE — H&P ADULT - HISTORY OF PRESENT ILLNESS
Pt is 84 y.o. M w/ PMH of HTN, right SDH s/p evac. (2006), left IPH s/p evac./hemicrani/plasty (2009) c/b [facility] aphasia @[facility] who presents for one day of chest pain. This morning, pt felt unwell and fatigued after using the bathroom, so EMS was called. When EMS arrived, pt began having sudden-onset chest pain w/ radiation down left arm and a/w SOB. Pt received 325mg of ASA via EMS.    In the ED, pt was HDS, /63, HR 66, afebrile, but required 2LNC. EKG showed CESAR in V3-V5 w/o reciprocal changes. TTE w/o RWMA or change in LV function but notable for RV dysfunction. CTH w/o acute hemorrhage, but shows L frontal encephalomalacia c/w known IPH and suspicion for underlying mass in L frontoparietal lobes. CXR showed atelectasis. Pt is 84 y.o. M w/ PMH of right SDH s/p evac. (2006) at [facility], left IPH s/p evac./hemicrani/plasty (2009) c/b expressive aphasia @[facility], COPD, hypothyroidism, HTN who presents for one day of chest pain. Overnight, pt had mild intermittent L-sided chest pain radiating down his L arm that was waking him up several times throughout the night. Pt's wife also noted that over the past week, pt has been having more SOB requiring usage of his albuterol inhaler. After eating breakfast, pt's wife noticed that pt was very weak but responsive. Pt then began having worsening chest pain, so pt's wife called EMS. Pt received nitroglycerin and 325mg of ASA via EMS. Pt denied any headaches, dizziness, pain w/ inspiration, abdominal pain, N/V, diarrhea, or constipation.    In the ED, pt was HDS, /63, HR 66, afebrile, but required 2LNC. EKG showed CESAR in V3-V5 w/o reciprocal changes. TTE w/o RWMA or change in LV function but notable for RV dysfunction. CTH w/o acute hemorrhage, but shows L frontal encephalomalacia c/w known IPH and suspicion for underlying mass in L frontoparietal lobes. CXR showed atelectasis. At time of exam, pt denied any chest pain or SOB; pt satting well on RA. As per pt's wife, pt regularly has MRI and MRA of head done w/ neurologist but w/o contrast as per pt's wife's request. Last MRI and MRA head in 2/2025 showing no acute changes as per pt's wife. Pt is 84 y.o. M w/ PMH of right SDH s/p evac. (2006) at [facility], left IPH s/p evac./hemicrani/plasty (2009) c/b expressive aphasia @[facility], COPD, hypothyroidism, HTN who presents for one day of chest pain. Overnight, pt had mild intermittent L-sided chest pain radiating down his L arm that was waking him up several times throughout the night. Pt's wife also noted that over the past week, pt has been having more SOB requiring usage of his albuterol inhaler. After eating breakfast, pt's wife noticed that pt was very weak but responsive. Pt then began having worsening chest pain, so pt's wife called EMS. Pt received nitroglycerin and 325mg of ASA via EMS. Pt denied any headaches, dizziness, pain w/ inspiration, abdominal pain, N/V, diarrhea, or constipation.    In the ED, pt was HDS, /63, HR 66, afebrile, but required 2LNC. EKG showed CESAR in V3-V5 w/o reciprocal changes. TTE w/o RWMA or change in LV function but notable for RV dysfunction, concerning for possible PE. CTH w/o acute hemorrhage, but shows L frontal encephalomalacia c/w known IPH and suspicion for underlying mass in L frontoparietal lobes. CXR showed atelectasis. At time of exam, pt denied any chest pain or SOB; pt satting well on RA. As per pt's wife, pt regularly has MRI and MRA of head done w/ neurologist but w/o contrast as per pt's wife's request. Last MRI and MRA head in 2/2025 showed no acute changes as per wife. Pt is 84 y.o. M w/ PMH of right SDH s/p evac. (2006) at [facility], left IPH s/p evac./hemicrani/plasty (2009) c/b expressive aphasia @[facility], COPD, hypothyroidism, HTN who presents for one day of chest pain. Overnight, pt had mild intermittent L-sided chest pain radiating down his L arm that was waking him up several times throughout the night. Pt's wife also noted that over the past week, pt has been having more SOB requiring usage of his albuterol inhaler. After eating breakfast, pt's wife noticed that pt was very weak but responsive. Pt then began having worsening chest pain, so pt's wife called EMS. Pt received nitroglycerin and 325mg of ASA via EMS. Pt denied any headaches, dizziness, pain w/ inspiration, abdominal pain, N/V, diarrhea, or constipation.    In the ED, pt was HDS, /63, HR 66, afebrile, but required 2LNC. EKG showed CESAR in V3-V5 w/o reciprocal changes. Trops 18 -> 27 -> 69. TTE w/o RWMA or change in LV function but notable for RV dysfunction, concerning for possible PE. CTH w/o acute hemorrhage, but shows L frontal encephalomalacia c/w known IPH and suspicion for underlying mass in L frontoparietal lobes. CXR showed atelectasis. At time of exam, pt denied any chest pain or SOB; pt satting well on RA. As per pt's wife, pt regularly has MRI and MRA of head done w/ neurologist but w/o contrast as per pt's wife's request. Last MRI and MRA head in 2/2025 showed no acute changes as per wife.

## 2025-05-11 NOTE — H&P ADULT - PROBLEM SELECTOR PLAN 4
- home meds: amlodipine 5mg QD, losartan 25mg QHS  - hold meds for now given normotension, can restart as needed

## 2025-05-11 NOTE — ED PROVIDER NOTE - PROGRESS NOTE DETAILS
MD Dee (PGY-3) Patient troponin increasing. Cardiology aware.  CTH with concern for possible mass.  Paging NSGY. MD Dee (PGY-3) NSGY evaluated patient.

## 2025-05-11 NOTE — H&P ADULT - PROBLEM SELECTOR PLAN 2
- H/o right SDH s/p evac. (2006) at [facility], left IPH s/p evac./hemicrani/plasty (2009) c/b expressive aphasia @[facility] [place]  - CT head (5/11): w/o acute hemorrhage, L frontal encephalomalacia c/w known IPH, suspicion for underlying mass in L frontoparietal lobes    Plan:  - c/w home Keppra 1000mg BID  - neurosx recs appreciated - recommending non-urgent MR head waw IVC to evaluate mass but pt's wife deferring at this time as she wanted to focus on possible PE, will rediscuss w/ wife once CTA chest is performed

## 2025-05-11 NOTE — ED ADULT NURSE NOTE - OBJECTIVE STATEMENT
Pt BIBEMS for cp x1 hr. They gave ASA 324mg and 1 ntg due to ST changes on their cardiac monitor. Wife is at bedside. Pt has a h/o brain anuerysm causing expressive aphasia (is able to answer Y/N questions), seizures controlled by bid Keppra, COPD using O2 prn, and all Pt BIBEMS for cp x1 hr. They gave ASA 324mg and 1 ntg due to ST changes on their cardiac monitor. Wife is at bedside. Pt has a h/o brain anuerysm causing expressive aphasia (is able to answer Y/N questions), seizures controlled by bid Keppra, COPD using O2 prn, high cholesterol on lipitor, and allergy to nuts and wool/lannolin.

## 2025-05-11 NOTE — H&P ADULT - NSICDXPASTMEDICALHX_GEN_ALL_CORE_FT
PAST MEDICAL HISTORY:  H/O spontaneous intraparenchymal intracranial hemorrhage     S/P subdural hematoma evacuation      PAST MEDICAL HISTORY:  H/O spontaneous intraparenchymal intracranial hemorrhage     History of COPD     Hypertension     Hypothyroidism     S/P subdural hematoma evacuation

## 2025-05-11 NOTE — H&P ADULT - PROBLEM SELECTOR PLAN 3
- home meds: albuterol inhaler PRN, Spiriva 18mcg capsule 1 puff QD, Wixela 100-50 1 puff BID  - c/w home meds equivalences: Advair, Spiriva, albuterol inhaler PRN

## 2025-05-11 NOTE — H&P ADULT - ASSESSMENT
Pt is 84 y.o. M w/ PMH of right SDH s/p evac. (2006), left IPH s/p evac./hemicrani/plasty (2009) c/b expressive aphasia, COPD, hypothyroidism, HTN admitted for chest pain, likely 2/2 PE, pending CTA chest.

## 2025-05-11 NOTE — H&P ADULT - NSHPPHYSICALEXAM_GEN_ALL_CORE
T(C): 36.1 (05-11-25 @ 17:08), Max: 36.3 (05-11-25 @ 11:00)  HR: 60 (05-11-25 @ 17:08) (60 - 78)  BP: 131/85 (05-11-25 @ 17:08) (112/63 - 142/84)  RR: 18 (05-11-25 @ 17:08) (16 - 22)  SpO2: 94% (05-11-25 @ 17:08) (94% - 97%)    CONSTITUTIONAL: Well groomed, no apparent distress  EYES: PERRLA and symmetric, EOMI, No conjunctival or scleral injection, non-icteric  ENMT: Oral mucosa with moist membranes. Normal dentition; no pharyngeal injection or exudates             NECK: Supple, symmetric and without tracheal deviation   RESP: No respiratory distress, no use of accessory muscles; CTA b/l, no WRR  CV: RRR, +S1S2, no MRG; no JVD; no peripheral edema  GI: Soft, NT, ND, no rebound, no guarding; no palpable masses; no hepatosplenomegaly; no hernia palpated  LYMPH: No cervical LAD or tenderness; no axillary LAD or tenderness; no inguinal LAD or tenderness  MSK: Normal gait; No digital clubbing or cyanosis; examination of the (head/neck/spine/ribs/pelvis, RUE, LUE, RLE, LLE) without misalignment,            Normal ROM without pain, no spinal tenderness, normal muscle strength/tone  SKIN: No rashes or ulcers noted; no subcutaneous nodules or induration palpable  NEURO: CN II-XII intact; normal reflexes in upper and lower extremities, sensation intact in upper and lower extremities b/l to light touch   PSYCH: Appropriate insight/judgment; A+O x 3, mood and affect appropriate, recent/remote memory intact T(C): 36.1 (05-11-25 @ 17:08), Max: 36.3 (05-11-25 @ 11:00)  HR: 60 (05-11-25 @ 17:08) (60 - 78)  BP: 131/85 (05-11-25 @ 17:08) (112/63 - 142/84)  RR: 18 (05-11-25 @ 17:08) (16 - 22)  SpO2: 94% (05-11-25 @ 17:08) (94% - 97%)    CONSTITUTIONAL: Well groomed, no apparent distress  EYES: PERRLA and symmetric, EOMI, No conjunctival or scleral injection, non-icteric  ENMT: Oral mucosa with moist membranes. Normal dentition; no pharyngeal injection or exudates             NECK: Supple, symmetric and without tracheal deviation   RESP: No respiratory distress, no use of accessory muscles; CTA b/l, no WRR  CV: RRR, +S1S2, no MRG; no JVD  GI: Soft, NT, ND, no rebound, no guarding; no palpable masses; no hepatosplenomegaly; no hernia palpated  MSK: +1 pitting edema in b/l LEs; Normal gait; No digital clubbing or cyanosis; examination of the (head/neck/spine/ribs/pelvis, RUE, LUE, RLE, LLE) without misalignment,   SKIN: No rashes or ulcers noted; no subcutaneous nodules or induration palpable  NEURO: sensation intact in upper and lower extremities b/l to light touch   PSYCH: Appropriate insight/judgment; A+O x 2 (oriented to person, place, but not year - at baseline, needs prompting from wife as pt has expressive aphasia), mood and affect appropriate

## 2025-05-11 NOTE — ED ADULT NURSE NOTE - CHIEF COMPLAINT QUOTE
chest pain, diaphoretic, hx CVA/head bleed --> expressive aphasia, CSEAR on lead 3&4 by EMS, asa 324 mg chewable, nitro SL x 1 given with relief

## 2025-05-11 NOTE — H&P ADULT - NSHPLABSRESULTS_GEN_ALL_CORE
LABS:                          15.3   8.28  )-----------( 146      ( 11 May 2025 11:04 )             48.2     05-11    137  |  101  |  20  ----------------------------<  191[H]  4.9   |  23  |  0.64    Ca    9.1      11 May 2025 11:04  Mg     2.0     05-11    TPro  7.2  /  Alb  3.9  /  TBili  0.5  /  DBili  x   /  AST  24  /  ALT  19  /  AlkPhos  64  05-11    PT/INR - ( 11 May 2025 11:04 )   PT: 11.6 sec;   INR: 1.01 ratio         PTT - ( 11 May 2025 11:04 )  PTT:21.8 sec

## 2025-05-11 NOTE — H&P ADULT - NSICDXPASTSURGICALHX_GEN_ALL_CORE_FT
PAST SURGICAL HISTORY:  Status post laser cataract surgery of both eyes      PAST SURGICAL HISTORY:  H/O craniotomy     S/P subdural hematoma evacuation     Status post laser cataract surgery of both eyes

## 2025-05-11 NOTE — H&P ADULT - PROBLEM SELECTOR PLAN 6
- DVT PPX: Lovenox SQ  - GI PPX: N/A  - Diet: soft and bite sized - DASH  - PT eval pending  - Dispo: pending clinical course

## 2025-05-11 NOTE — PATIENT PROFILE ADULT - FALL HARM RISK - HARM RISK INTERVENTIONS
Assistance with ambulation/Assistance OOB with selected safe patient handling equipment/Communicate Risk of Fall with Harm to all staff/Discuss with provider need for PT consult/Monitor gait and stability/Reinforce activity limits and safety measures with patient and family/Tailored Fall Risk Interventions/Visual Cue: Yellow wristband and red socks/Bed in lowest position, wheels locked, appropriate side rails in place/Call bell, personal items and telephone in reach/Instruct patient to call for assistance before getting out of bed or chair/Non-slip footwear when patient is out of bed/[place] to call system/Physically safe environment - no spills, clutter or unnecessary equipment/Purposeful Proactive Rounding/Room/bathroom lighting operational, light cord in reach

## 2025-05-12 LAB
ADD ON TEST-SPECIMEN IN LAB: SIGNIFICANT CHANGE UP
ADD ON TEST-SPECIMEN IN LAB: SIGNIFICANT CHANGE UP
ANION GAP SERPL CALC-SCNC: 12 MMOL/L — SIGNIFICANT CHANGE UP (ref 5–17)
BUN SERPL-MCNC: 17 MG/DL — SIGNIFICANT CHANGE UP (ref 7–23)
CALCIUM SERPL-MCNC: 8.9 MG/DL — SIGNIFICANT CHANGE UP (ref 8.4–10.5)
CHLORIDE SERPL-SCNC: 104 MMOL/L — SIGNIFICANT CHANGE UP (ref 96–108)
CO2 SERPL-SCNC: 24 MMOL/L — SIGNIFICANT CHANGE UP (ref 22–31)
CREAT SERPL-MCNC: 0.67 MG/DL — SIGNIFICANT CHANGE UP (ref 0.5–1.3)
CRP SERPL-MCNC: 5 MG/L — HIGH (ref 0–4)
EGFR: 91 ML/MIN/1.73M2 — SIGNIFICANT CHANGE UP
EGFR: 91 ML/MIN/1.73M2 — SIGNIFICANT CHANGE UP
ERYTHROCYTE [SEDIMENTATION RATE] IN BLOOD: 30 MM/HR — HIGH (ref 0–20)
GLUCOSE SERPL-MCNC: 92 MG/DL — SIGNIFICANT CHANGE UP (ref 70–99)
HCT VFR BLD CALC: 49.5 % — SIGNIFICANT CHANGE UP (ref 39–50)
HGB BLD-MCNC: 15.7 G/DL — SIGNIFICANT CHANGE UP (ref 13–17)
MAGNESIUM SERPL-MCNC: 2.1 MG/DL — SIGNIFICANT CHANGE UP (ref 1.6–2.6)
MCHC RBC-ENTMCNC: 28.3 PG — SIGNIFICANT CHANGE UP (ref 27–34)
MCHC RBC-ENTMCNC: 31.7 G/DL — LOW (ref 32–36)
MCV RBC AUTO: 89.2 FL — SIGNIFICANT CHANGE UP (ref 80–100)
NRBC BLD AUTO-RTO: 0 /100 WBCS — SIGNIFICANT CHANGE UP (ref 0–0)
NT-PROBNP SERPL-SCNC: 1168 PG/ML — HIGH (ref 0–300)
PHOSPHATE SERPL-MCNC: 3.1 MG/DL — SIGNIFICANT CHANGE UP (ref 2.5–4.5)
PLATELET # BLD AUTO: 134 K/UL — LOW (ref 150–400)
POTASSIUM SERPL-MCNC: 4.1 MMOL/L — SIGNIFICANT CHANGE UP (ref 3.5–5.3)
POTASSIUM SERPL-SCNC: 4.1 MMOL/L — SIGNIFICANT CHANGE UP (ref 3.5–5.3)
RBC # BLD: 5.55 M/UL — SIGNIFICANT CHANGE UP (ref 4.2–5.8)
RBC # FLD: 15.4 % — HIGH (ref 10.3–14.5)
SODIUM SERPL-SCNC: 140 MMOL/L — SIGNIFICANT CHANGE UP (ref 135–145)
T4 FREE+ TSH PNL SERPL: 1.48 UIU/ML — SIGNIFICANT CHANGE UP (ref 0.27–4.2)
TROPONIN T, HIGH SENSITIVITY RESULT: 1881 NG/L — HIGH (ref 0–51)
TROPONIN T, HIGH SENSITIVITY RESULT: 2443 NG/L — HIGH (ref 0–51)
TROPONIN T, HIGH SENSITIVITY RESULT: 3512 NG/L — HIGH (ref 0–51)
TROPONIN T, HIGH SENSITIVITY RESULT: 882 NG/L — HIGH (ref 0–51)
WBC # BLD: 6.17 K/UL — SIGNIFICANT CHANGE UP (ref 3.8–10.5)
WBC # FLD AUTO: 6.17 K/UL — SIGNIFICANT CHANGE UP (ref 3.8–10.5)

## 2025-05-12 PROCEDURE — 99233 SBSQ HOSP IP/OBS HIGH 50: CPT | Mod: GC

## 2025-05-12 PROCEDURE — 71275 CT ANGIOGRAPHY CHEST: CPT | Mod: 26

## 2025-05-12 PROCEDURE — 93010 ELECTROCARDIOGRAM REPORT: CPT | Mod: 77

## 2025-05-12 PROCEDURE — 99223 1ST HOSP IP/OBS HIGH 75: CPT

## 2025-05-12 PROCEDURE — 70553 MRI BRAIN STEM W/O & W/DYE: CPT | Mod: 26

## 2025-05-12 PROCEDURE — 93010 ELECTROCARDIOGRAM REPORT: CPT | Mod: 76,77

## 2025-05-12 RX ORDER — HEPARIN SODIUM 1000 [USP'U]/ML
950 INJECTION INTRAVENOUS; SUBCUTANEOUS
Qty: 25000 | Refills: 0 | Status: DISCONTINUED | OUTPATIENT
Start: 2025-05-12 | End: 2025-05-12

## 2025-05-12 RX ORDER — HEPARIN SODIUM 1000 [USP'U]/ML
950 INJECTION INTRAVENOUS; SUBCUTANEOUS
Qty: 25000 | Refills: 0 | Status: DISCONTINUED | OUTPATIENT
Start: 2025-05-12 | End: 2025-05-13

## 2025-05-12 RX ORDER — ASPIRIN 325 MG
81 TABLET ORAL DAILY
Refills: 0 | Status: DISCONTINUED | OUTPATIENT
Start: 2025-05-12 | End: 2025-05-15

## 2025-05-12 RX ADMIN — Medication 81 MILLIGRAM(S): at 17:22

## 2025-05-12 RX ADMIN — HEPARIN SODIUM 9.5 UNIT(S)/HR: 1000 INJECTION INTRAVENOUS; SUBCUTANEOUS at 19:23

## 2025-05-12 RX ADMIN — LEVETIRACETAM 1000 MILLIGRAM(S): 10 INJECTION, SOLUTION INTRAVENOUS at 17:22

## 2025-05-12 RX ADMIN — Medication 1 DOSE(S): at 17:23

## 2025-05-12 RX ADMIN — Medication 2 PUFF(S): at 05:29

## 2025-05-12 RX ADMIN — TIOTROPIUM BROMIDE INHALATION SPRAY 2 PUFF(S): 3.12 SPRAY, METERED RESPIRATORY (INHALATION) at 13:14

## 2025-05-12 RX ADMIN — Medication 75 MICROGRAM(S): at 05:29

## 2025-05-12 RX ADMIN — Medication 1 DOSE(S): at 06:38

## 2025-05-12 RX ADMIN — LEVETIRACETAM 1000 MILLIGRAM(S): 10 INJECTION, SOLUTION INTRAVENOUS at 05:29

## 2025-05-12 RX ADMIN — HEPARIN SODIUM 9.5 UNIT(S)/HR: 1000 INJECTION INTRAVENOUS; SUBCUTANEOUS at 17:22

## 2025-05-12 NOTE — PROGRESS NOTE ADULT - SUBJECTIVE AND OBJECTIVE BOX
CARDIOLOGY ATTENDING PROGRESS NOTE     S:    Denies chest pain    O:    PE  Vital Signs Last 24 Hrs  T(C): 36.7 (12 May 2025 10:53), Max: 36.9 (12 May 2025 04:20)  T(F): 98 (12 May 2025 10:53), Max: 98.4 (12 May 2025 04:20)  HR: 64 (12 May 2025 10:53) (60 - 78)  BP: 130/75 (12 May 2025 10:53) (115/69 - 142/84)  BP(mean): 91 (12 May 2025 05:03) (91 - 111)  RR: 18 (12 May 2025 10:53) (16 - 20)  SpO2: 94% (12 May 2025 10:53) (92% - 97%)    Parameters below as of 12 May 2025 10:53  Patient On (Oxygen Delivery Method): nasal cannula  O2 Flow (L/min): 2    Gen: NAD, breathing comfortably  HEENT: MMM, anicteric sclera  Neck: JVP < 10 cm  Cardiac: RRR, no m/r/g  Lungs: Adequate inspiratory effort. CTAB  Abd: soft, NT/ND. +BS  Ext: warm and well perfused. No significant LE edema  Psych: grossly normal affect  Neuro: moves all extremities    Data:  I have personally reviewed all data:  Labs reviewed    CXR  IMPRESSION:  Hazy opacity at the lung bases possibly representing atelectasis.   Otherwise clear lungs.    EK25: sinus rhythm with 1st AV block. IVCD. Possible inferior infarct  25: sinus rhythm. LAD. ST election consider acute inferior infarct    Echo:  personally reviewed. Enlarged RV with reduced systolic function. Free wall hypokinesis with relative preservation of the RV apex. Possible Cowart's sign  grossly low normal LVEF 50-55%. No inferior wall hypokinesis noted    Meds   MEDICATIONS  (STANDING):  fluticasone propionate/ salmeterol 100-50 MICROgram(s) Diskus 1 Dose(s) Inhalation two times a day  levETIRAcetam 1000 milliGRAM(s) Oral two times a day  levothyroxine 75 MICROGram(s) Oral daily  tiotropium 2.5 MICROgram(s) Inhaler 2 Puff(s) Inhalation daily    MEDICATIONS  (PRN):  albuterol    90 MICROgram(s) HFA Inhaler 2 Puff(s) Inhalation every 6 hours PRN for shortness of breath and/or wheezing    A/P    CLAYTON SCOTT is a 84y Male with a history intracranial bleeding, expressive aphasia, HTN who presented with chest pain with radiation down the left arm. He received 325 mg x 1 load of ASA. TTE showed RV failure and possible Cowart's sign    #elevated troponin concerning for RV infarction, cannot exclude inferior STEMI  #acute hypoxemic respiratory failure  #possible brain mass, history intracranial bleeding  #1st AV block  #HTN    TTE personally reviewed and shows possible Cowart's sign    -CTA chest should be done stat.  -MRI brain should be done stat/urgently. The presence of a brain mass needs to be clarified prior to any decision on dual antiplatelet therapy  -s/p  x 1 per charting. Continue ASA 81 mg daily. No P2Y12 inhibitor given the absence of clarity on brain mass, history of brain bleed, CTA pending and that P2Y12 can be given at the time of LHC/PCI were this to be necessary. I believe risk/benefit favors not giving upfront at this time. Okay to start heparin gtt for empiric coverage of PE and NSTEMI if okay from neurosurgery standpoint. Would not bolus. Will reevaluate risk/benefit of antiplatelet therapy after CT  -if no PE, then plan for LHC AFTER MRI and neurosurgery input taking into account MRI brain   -if PE present, given troponin elevation and RV failure would have evaluated for direct lytics   -if the patient shows evidence of decompensation, low threshold for ICU evaluation    We will continue to follow with you. Thank you for allowing us to participate in this patient's care.    Nalini Mckeon MD   [facility]    For [facility] Cardiology and Cardiovascular Surgery  service contact information, please go to [email or web address] ("[email or web address]" to login) CARDIOLOGY ATTENDING PROGRESS NOTE     S:    Denies chest pain    O:    PE  Vital Signs Last 24 Hrs  T(C): 36.7 (12 May 2025 10:53), Max: 36.9 (12 May 2025 04:20)  T(F): 98 (12 May 2025 10:53), Max: 98.4 (12 May 2025 04:20)  HR: 64 (12 May 2025 10:53) (60 - 78)  BP: 130/75 (12 May 2025 10:53) (115/69 - 142/84)  BP(mean): 91 (12 May 2025 05:03) (91 - 111)  RR: 18 (12 May 2025 10:53) (16 - 20)  SpO2: 94% (12 May 2025 10:53) (92% - 97%)    Parameters below as of 12 May 2025 10:53  Patient On (Oxygen Delivery Method): nasal cannula  O2 Flow (L/min): 2    Gen: NAD, breathing comfortably  HEENT: MMM, anicteric sclera  Neck: JVP < 10 cm  Cardiac: RRR, no m/r/g  Lungs: Adequate inspiratory effort. CTAB  Abd: soft, NT/ND. +BS  Ext: warm and well perfused. No significant LE edema  Psych: grossly normal affect  Neuro: moves all extremities    Data:  I have personally reviewed all data:  Labs reviewed    CXR  IMPRESSION:  Hazy opacity at the lung bases possibly representing atelectasis.   Otherwise clear lungs.    EK25: sinus rhythm with 1st AV block. IVCD. Possible inferior infarct  25: sinus rhythm. LAD. ST election consider acute inferior infarct    Echo:  personally reviewed. Enlarged RV with reduced systolic function. Free wall hypokinesis with relative preservation of the RV apex. Possible Cowart's sign  grossly low normal LVEF 50-55%. No inferior wall hypokinesis noted    Meds   MEDICATIONS  (STANDING):  fluticasone propionate/ salmeterol 100-50 MICROgram(s) Diskus 1 Dose(s) Inhalation two times a day  levETIRAcetam 1000 milliGRAM(s) Oral two times a day  levothyroxine 75 MICROGram(s) Oral daily  tiotropium 2.5 MICROgram(s) Inhaler 2 Puff(s) Inhalation daily    MEDICATIONS  (PRN):  albuterol    90 MICROgram(s) HFA Inhaler 2 Puff(s) Inhalation every 6 hours PRN for shortness of breath and/or wheezing    Tele: 8 beats of NSVT    A/P    CLAYTON SCOTT is a 84y Male with a history intracranial bleeding, expressive aphasia, HTN who presented with chest pain with radiation down the left arm. He received 325 mg x 1 load of ASA. TTE showed RV failure and possible Cowart's sign    #elevated troponin concerning for RV infarction, cannot exclude inferior STEMI  #acute hypoxemic respiratory failure  #possible brain mass, history intracranial bleeding  #NSVT   #1st AV block  #HTN    TTE personally reviewed and shows possible Cowart's sign    -CTA chest should be done stat.  -MRI brain should be done stat/urgently. The presence of a brain mass needs to be clarified prior to any decision on dual antiplatelet therapy  -s/p  x 1 per charting. Continue ASA 81 mg daily. No P2Y12 inhibitor given the absence of clarity on brain mass, history of brain bleed, CTA pending and that P2Y12 can be given at the time of LHC/PCI were this to be necessary. I believe risk/benefit favors not giving upfront at this time. Okay to start heparin gtt for empiric coverage of PE and NSTEMI if okay from neurosurgery standpoint. Would not bolus. Will reevaluate risk/benefit of antiplatelet therapy after CT  -if no PE, then plan for LHC AFTER MRI and neurosurgery input taking into account MRI brain   -if PE present, given troponin elevation and RV failure would have evaluated for direct lytics   -if the patient shows evidence of decompensation, low threshold for ICU evaluation    We will continue to follow with you. Thank you for allowing us to participate in this patient's care.    Nalini Mckeon MD  [facility]    For [facility] Cardiology and Cardiovascular Surgery  service contact information, please go to [email or web address] ("[email or web address]" to login)
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
## 2025-05-12 NOTE — PROGRESS NOTE ADULT - PROBLEM SELECTOR PLAN 6
- DVT PPX: Lovenox SQ  - GI PPX: N/A  - Diet: soft and bite sized - DASH  - PT eval pending  - Dispo: pending clinical course - DVT PPX: hep gtt  - GI PPX: N/A  - Diet: soft and bite sized - DASH  - PT eval pending  - Dispo: pending clinical course
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
## 2025-05-12 NOTE — PROGRESS NOTE ADULT - PROBLEM SELECTOR PLAN 1
- EKG showing CESAR in V3-V5, TTE EF 62% w/o RWMA or change in LV function but showing RV dysfunction  - trops on admission 18 -> 27 -> 69    Plan:  - cards recs appreciated  - CTA chest to r/o PE pending  - monitor on tele  - trend trops until peak  - neurosx recs appreciated - no absolute contraindication to start A/C  - if CTA chest (+) for PE, start hep gtt  - once hep gtt started, start neuro checks q4h - if AMS, get stat CT head, stop hep gtt - EKG showing CESAR in V3-V5, TTE EF 62% w/o RWMA or change in LV function but showing RV dysfunction  - trops on admission 18 -> 27 -> 69 -> 187 -> 882 -> 1881    Plan:  - neurosx recs appreciated - no absolute contraindication to start A/C  - cards recs appreciated - ok w/ starting hep gtt (no bolus) while waiting for CTA chest for empiric coverage of PE and NSTEMI  - f/u CTA chest to r/o PE  - if no PE, then plan for LHC AFTER MRI and neurosurgery input taking into account MRI brain   - if PE present, given troponin elevation and RV failure would have evaluated for direct lytics   - if patient shows evidence of decompensation, low threshold for ICU eval  - ASA 81mg QD, hold off on P2Y12 inhibitor until MR head done  - neuro checks q4h  - once aPTT therapeutic, repeat CT head  - monitor on tele  - trend trops until peak

## 2025-05-12 NOTE — PROGRESS NOTE ADULT - SUBJECTIVE AND OBJECTIVE BOX
Patient is a 84y old  Male who presents with a chief complaint of chest pain (11 May 2025 17:00)      SUBJECTIVE / OVERNIGHT EVENTS: Patient seen and examined at bedside. No acute events overnight.    MEDICATIONS  (STANDING):  enoxaparin Injectable 40 milliGRAM(s) SubCutaneous every 24 hours  fluticasone propionate/ salmeterol 100-50 MICROgram(s) Diskus 1 Dose(s) Inhalation two times a day  levETIRAcetam 1000 milliGRAM(s) Oral two times a day  levothyroxine 75 MICROGram(s) Oral daily  tiotropium 2.5 MICROgram(s) Inhaler 2 Puff(s) Inhalation daily    MEDICATIONS  (PRN):  albuterol    90 MICROgram(s) HFA Inhaler 2 Puff(s) Inhalation every 6 hours PRN for shortness of breath and/or wheezing      Vital Signs Last 24 Hrs  T(C): 36.9 (12 May 2025 04:20), Max: 36.9 (12 May 2025 04:20)  T(F): 98.4 (12 May 2025 04:20), Max: 98.4 (12 May 2025 04:20)  HR: 62 (12 May 2025 05:03) (60 - 78)  BP: 127/72 (12 May 2025 05:03) (112/63 - 142/84)  BP(mean): 91 (12 May 2025 05:03) (91 - 111)  RR: 18 (12 May 2025 05:03) (16 - 22)  SpO2: 94% (12 May 2025 05:03) (92% - 97%)    Parameters below as of 12 May 2025 05:03  Patient On (Oxygen Delivery Method): nasal cannula  O2 Flow (L/min): 2    CAPILLARY BLOOD GLUCOSE        I&O's Summary    11 May 2025 07:01  -  12 May 2025 07:00  --------------------------------------------------------  IN: 180 mL / OUT: 0 mL / NET: 180 mL        PHYSICAL EXAM:  GENERAL: NAD, resting comfortably in bed  HEAD:  Atraumatic, Normocephalic  EYES: EOMI, PERRLA, conjunctiva and sclera clear  NECK: Supple, No JVD  CHEST/LUNG: Clear to auscultation bilaterally; No wheeze  HEART: Regular rate and rhythm; No murmurs, rubs, or gallops  ABDOMEN: Soft, Nontender, Nondistended; Bowel sounds present  EXTREMITIES:  2+ Peripheral Pulses, No clubbing, cyanosis, or edema  PSYCH: AAOx3  NEUROLOGY: non-focal  SKIN: No rashes or lesions    LABS:                        15.7   6.17  )-----------( 134      ( 12 May 2025 06:59 )             49.5     05-11    137  |  101  |  20  ----------------------------<  191[H]  4.9   |  23  |  0.64    Ca    9.1      11 May 2025 11:04  Mg     2.0     05-11    TPro  7.2  /  Alb  3.9  /  TBili  0.5  /  DBili  x   /  AST  24  /  ALT  19  /  AlkPhos  64  05-11    PT/INR - ( 11 May 2025 11:04 )   PT: 11.6 sec;   INR: 1.01 ratio         PTT - ( 11 May 2025 11:04 )  PTT:21.8 sec  CARDIAC MARKERS ( 11 May 2025 11:04 )  x     / x     / x     / x     / 4.0 ng/mL      Urinalysis Basic - ( 11 May 2025 11:04 )    Color: x / Appearance: x / SG: x / pH: x  Gluc: 191 mg/dL / Ketone: x  / Bili: x / Urobili: x   Blood: x / Protein: x / Nitrite: x   Leuk Esterase: x / RBC: x / WBC x   Sq Epi: x / Non Sq Epi: x / Bacteria: x        RADIOLOGY & ADDITIONAL TESTS:    Imaging Personally Reviewed:    Consultant(s) Notes Reviewed:      Care Discussed with Consultants/Other Providers:    Brielle Elliott MD, Internal Medicine Resident     Patient is a 84y old  Male who presents with a chief complaint of chest pain (11 May 2025 17:00)      SUBJECTIVE / OVERNIGHT EVENTS: Patient seen and examined at bedside. Overnight, trops increased from 187 to 882, now 1881 this AM. EKG overnight w/o ischemic changes. Pt continues to deny any chest pain or SOB. Walking around w/ no issues and was no longer on O2NC.    MEDICATIONS  (STANDING):  enoxaparin Injectable 40 milliGRAM(s) SubCutaneous every 24 hours  fluticasone propionate/ salmeterol 100-50 MICROgram(s) Diskus 1 Dose(s) Inhalation two times a day  levETIRAcetam 1000 milliGRAM(s) Oral two times a day  levothyroxine 75 MICROGram(s) Oral daily  tiotropium 2.5 MICROgram(s) Inhaler 2 Puff(s) Inhalation daily    MEDICATIONS  (PRN):  albuterol    90 MICROgram(s) HFA Inhaler 2 Puff(s) Inhalation every 6 hours PRN for shortness of breath and/or wheezing      Vital Signs Last 24 Hrs  T(C): 36.9 (12 May 2025 04:20), Max: 36.9 (12 May 2025 04:20)  T(F): 98.4 (12 May 2025 04:20), Max: 98.4 (12 May 2025 04:20)  HR: 62 (12 May 2025 05:03) (60 - 78)  BP: 127/72 (12 May 2025 05:03) (112/63 - 142/84)  BP(mean): 91 (12 May 2025 05:03) (91 - 111)  RR: 18 (12 May 2025 05:03) (16 - 22)  SpO2: 94% (12 May 2025 05:03) (92% - 97%)    Parameters below as of 12 May 2025 05:03  Patient On (Oxygen Delivery Method): nasal cannula  O2 Flow (L/min): 2    CAPILLARY BLOOD GLUCOSE        I&O's Summary    11 May 2025 07:01  -  12 May 2025 07:00  --------------------------------------------------------  IN: 180 mL / OUT: 0 mL / NET: 180 mL        PHYSICAL EXAM:  GENERAL: NAD, resting comfortably in bed  HEAD:  Atraumatic, Normocephalic  EYES: EOMI, PERRLA, conjunctiva and sclera clear  NECK: Supple, No JVD  CHEST/LUNG: Clear to auscultation bilaterally; No wheeze  HEART: Regular rate and rhythm; No murmurs, rubs, or gallops  ABDOMEN: Soft, Nontender, Nondistended; Bowel sounds present  EXTREMITIES: +1 pitting edema in b/l LEs (at baseline as per pt and wife); 2+ Peripheral Pulses, No clubbing, cyanosis  PSYCH: AAOx3  NEUROLOGY: non-focal  SKIN: No rashes or lesions    LABS:                        15.7   6.17  )-----------( 134      ( 12 May 2025 06:59 )             49.5     05-11    137  |  101  |  20  ----------------------------<  191[H]  4.9   |  23  |  0.64    Ca    9.1      11 May 2025 11:04  Mg     2.0     05-11    TPro  7.2  /  Alb  3.9  /  TBili  0.5  /  DBili  x   /  AST  24  /  ALT  19  /  AlkPhos  64  05-11    PT/INR - ( 11 May 2025 11:04 )   PT: 11.6 sec;   INR: 1.01 ratio         PTT - ( 11 May 2025 11:04 )  PTT:21.8 sec  CARDIAC MARKERS ( 11 May 2025 11:04 )  x     / x     / x     / x     / 4.0 ng/mL      Urinalysis Basic - ( 11 May 2025 11:04 )    Color: x / Appearance: x / SG: x / pH: x  Gluc: 191 mg/dL / Ketone: x  / Bili: x / Urobili: x   Blood: x / Protein: x / Nitrite: x   Leuk Esterase: x / RBC: x / WBC x   Sq Epi: x / Non Sq Epi: x / Bacteria: x        RADIOLOGY & ADDITIONAL TESTS:    Imaging Personally Reviewed:    Consultant(s) Notes Reviewed:      Care Discussed with Consultants/Other Providers:    Brielle Elliott MD, Internal Medicine Resident     Patient is a 84y old  Male who presents with a chief complaint of chest pain (11 May 2025 17:00)      SUBJECTIVE / OVERNIGHT EVENTS: Patient seen and examined at bedside. Overnight, trops increased from 187 to 882, now 1881 this AM. EKG overnight w/o ischemic changes. Pt continues to deny any chest pain or SOB. Walking around w/ no issues and was no longer on O2NC.    MEDICATIONS  (STANDING):  enoxaparin Injectable 40 milliGRAM(s) SubCutaneous every 24 hours  fluticasone propionate/ salmeterol 100-50 MICROgram(s) Diskus 1 Dose(s) Inhalation two times a day  levETIRAcetam 1000 milliGRAM(s) Oral two times a day  levothyroxine 75 MICROGram(s) Oral daily  tiotropium 2.5 MICROgram(s) Inhaler 2 Puff(s) Inhalation daily    MEDICATIONS  (PRN):  albuterol    90 MICROgram(s) HFA Inhaler 2 Puff(s) Inhalation every 6 hours PRN for shortness of breath and/or wheezing      Vital Signs Last 24 Hrs  T(C): 36.9 (12 May 2025 04:20), Max: 36.9 (12 May 2025 04:20)  T(F): 98.4 (12 May 2025 04:20), Max: 98.4 (12 May 2025 04:20)  HR: 62 (12 May 2025 05:03) (60 - 78)  BP: 127/72 (12 May 2025 05:03) (112/63 - 142/84)  BP(mean): 91 (12 May 2025 05:03) (91 - 111)  RR: 18 (12 May 2025 05:03) (16 - 22)  SpO2: 94% (12 May 2025 05:03) (92% - 97%)    Parameters below as of 12 May 2025 05:03  Patient On (Oxygen Delivery Method): nasal cannula  O2 Flow (L/min): 2    CAPILLARY BLOOD GLUCOSE        I&O's Summary    11 May 2025 07:01  -  12 May 2025 07:00  --------------------------------------------------------  IN: 180 mL / OUT: 0 mL / NET: 180 mL        PHYSICAL EXAM:  GENERAL: NAD, resting comfortably in bed  HEAD:  Atraumatic, Normocephalic  EYES: EOMI, PERRLA, conjunctiva and sclera clear  NECK: Supple, No JVD  CHEST/LUNG: Clear to auscultation bilaterally; No wheeze  HEART: Regular rate and rhythm; No murmurs, rubs, or gallops  ABDOMEN: Soft, Nontender, Nondistended; Bowel sounds present  EXTREMITIES: +1 pitting edema in b/l LEs (at baseline as per pt and wife); 2+ Peripheral Pulses, No clubbing, cyanosis  PSYCH: AAOx3  NEUROLOGY: non-focal  SKIN: No rashes or lesions    LABS:                        15.7   6.17  )-----------( 134      ( 12 May 2025 06:59 )             49.5     05-11    137  |  101  |  20  ----------------------------<  191[H]  4.9   |  23  |  0.64    Ca    9.1      11 May 2025 11:04  Mg     2.0     05-11    TPro  7.2  /  Alb  3.9  /  TBili  0.5  /  DBili  x   /  AST  24  /  ALT  19  /  AlkPhos  64  05-11    PT/INR - ( 11 May 2025 11:04 )   PT: 11.6 sec;   INR: 1.01 ratio         PTT - ( 11 May 2025 11:04 )  PTT:21.8 sec  CARDIAC MARKERS ( 11 May 2025 11:04 )  x     / x     / x     / x     / 4.0 ng/mL      Urinalysis Basic - ( 11 May 2025 11:04 )    Color: x / Appearance: x / SG: x / pH: x  Gluc: 191 mg/dL / Ketone: x  / Bili: x / Urobili: x   Blood: x / Protein: x / Nitrite: x   Leuk Esterase: x / RBC: x / WBC x   Sq Epi: x / Non Sq Epi: x / Bacteria: x        RADIOLOGY & ADDITIONAL TESTS:    Imaging Personally Reviewed:    Consultant(s) Notes Reviewed:  Cardiology    Care Discussed with Consultants/Other Providers: Cardiology    Brielle Elliott MD, Internal Medicine Resident
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
## 2025-05-13 ENCOUNTER — TRANSCRIPTION ENCOUNTER (OUTPATIENT)
Age: 85
End: 2025-05-13

## 2025-05-13 LAB
ANION GAP SERPL CALC-SCNC: 12 MMOL/L — SIGNIFICANT CHANGE UP (ref 5–17)
APTT BLD: 47.2 SEC — HIGH (ref 26.1–36.8)
APTT BLD: 60.7 SEC — HIGH (ref 26.1–36.8)
APTT BLD: 60.8 SEC — HIGH (ref 26.1–36.8)
BUN SERPL-MCNC: 16 MG/DL — SIGNIFICANT CHANGE UP (ref 7–23)
CALCIUM SERPL-MCNC: 8.6 MG/DL — SIGNIFICANT CHANGE UP (ref 8.4–10.5)
CHLORIDE SERPL-SCNC: 105 MMOL/L — SIGNIFICANT CHANGE UP (ref 96–108)
CO2 SERPL-SCNC: 24 MMOL/L — SIGNIFICANT CHANGE UP (ref 22–31)
CREAT SERPL-MCNC: 0.65 MG/DL — SIGNIFICANT CHANGE UP (ref 0.5–1.3)
EGFR: 92 ML/MIN/1.73M2 — SIGNIFICANT CHANGE UP
EGFR: 92 ML/MIN/1.73M2 — SIGNIFICANT CHANGE UP
GLUCOSE SERPL-MCNC: 83 MG/DL — SIGNIFICANT CHANGE UP (ref 70–99)
HCT VFR BLD CALC: 48 % — SIGNIFICANT CHANGE UP (ref 39–50)
HCT VFR BLD CALC: 50.8 % — HIGH (ref 39–50)
HCT VFR BLD CALC: 51.6 % — HIGH (ref 39–50)
HGB BLD-MCNC: 15.4 G/DL — SIGNIFICANT CHANGE UP (ref 13–17)
HGB BLD-MCNC: 16.2 G/DL — SIGNIFICANT CHANGE UP (ref 13–17)
HGB BLD-MCNC: 16.2 G/DL — SIGNIFICANT CHANGE UP (ref 13–17)
MAGNESIUM SERPL-MCNC: 2 MG/DL — SIGNIFICANT CHANGE UP (ref 1.6–2.6)
MCHC RBC-ENTMCNC: 28.1 PG — SIGNIFICANT CHANGE UP (ref 27–34)
MCHC RBC-ENTMCNC: 28.4 PG — SIGNIFICANT CHANGE UP (ref 27–34)
MCHC RBC-ENTMCNC: 28.5 PG — SIGNIFICANT CHANGE UP (ref 27–34)
MCHC RBC-ENTMCNC: 31.4 G/DL — LOW (ref 32–36)
MCHC RBC-ENTMCNC: 31.9 G/DL — LOW (ref 32–36)
MCHC RBC-ENTMCNC: 32.1 G/DL — SIGNIFICANT CHANGE UP (ref 32–36)
MCV RBC AUTO: 88.6 FL — SIGNIFICANT CHANGE UP (ref 80–100)
MCV RBC AUTO: 89.3 FL — SIGNIFICANT CHANGE UP (ref 80–100)
MCV RBC AUTO: 89.6 FL — SIGNIFICANT CHANGE UP (ref 80–100)
NRBC BLD AUTO-RTO: 0 /100 WBCS — SIGNIFICANT CHANGE UP (ref 0–0)
PHOSPHATE SERPL-MCNC: 2.5 MG/DL — SIGNIFICANT CHANGE UP (ref 2.5–4.5)
PLATELET # BLD AUTO: 141 K/UL — LOW (ref 150–400)
PLATELET # BLD AUTO: 141 K/UL — LOW (ref 150–400)
PLATELET # BLD AUTO: 142 K/UL — LOW (ref 150–400)
POTASSIUM SERPL-MCNC: 4 MMOL/L — SIGNIFICANT CHANGE UP (ref 3.5–5.3)
POTASSIUM SERPL-SCNC: 4 MMOL/L — SIGNIFICANT CHANGE UP (ref 3.5–5.3)
RBC # BLD: 5.42 M/UL — SIGNIFICANT CHANGE UP (ref 4.2–5.8)
RBC # BLD: 5.69 M/UL — SIGNIFICANT CHANGE UP (ref 4.2–5.8)
RBC # BLD: 5.76 M/UL — SIGNIFICANT CHANGE UP (ref 4.2–5.8)
RBC # FLD: 15.6 % — HIGH (ref 10.3–14.5)
RBC # FLD: 15.7 % — HIGH (ref 10.3–14.5)
RBC # FLD: 15.8 % — HIGH (ref 10.3–14.5)
SODIUM SERPL-SCNC: 141 MMOL/L — SIGNIFICANT CHANGE UP (ref 135–145)
TROPONIN T, HIGH SENSITIVITY RESULT: 4019 NG/L — HIGH (ref 0–51)
TROPONIN T, HIGH SENSITIVITY RESULT: 4118 NG/L — HIGH (ref 0–51)
WBC # BLD: 6.03 K/UL — SIGNIFICANT CHANGE UP (ref 3.8–10.5)
WBC # BLD: 7.56 K/UL — SIGNIFICANT CHANGE UP (ref 3.8–10.5)
WBC # BLD: 7.84 K/UL — SIGNIFICANT CHANGE UP (ref 3.8–10.5)
WBC # FLD AUTO: 6.03 K/UL — SIGNIFICANT CHANGE UP (ref 3.8–10.5)
WBC # FLD AUTO: 7.56 K/UL — SIGNIFICANT CHANGE UP (ref 3.8–10.5)
WBC # FLD AUTO: 7.84 K/UL — SIGNIFICANT CHANGE UP (ref 3.8–10.5)

## 2025-05-13 PROCEDURE — 99233 SBSQ HOSP IP/OBS HIGH 50: CPT

## 2025-05-13 RX ORDER — ATORVASTATIN CALCIUM 80 MG/1
40 TABLET, FILM COATED ORAL AT BEDTIME
Refills: 0 | Status: DISCONTINUED | OUTPATIENT
Start: 2025-05-13 | End: 2025-05-15

## 2025-05-13 RX ORDER — HEPARIN SODIUM 1000 [USP'U]/ML
1050 INJECTION INTRAVENOUS; SUBCUTANEOUS
Qty: 25000 | Refills: 0 | Status: DISCONTINUED | OUTPATIENT
Start: 2025-05-13 | End: 2025-05-14

## 2025-05-13 RX ORDER — HEPARIN SODIUM 1000 [USP'U]/ML
1050 INJECTION INTRAVENOUS; SUBCUTANEOUS
Qty: 25000 | Refills: 0 | Status: DISCONTINUED | OUTPATIENT
Start: 2025-05-13 | End: 2025-05-13

## 2025-05-13 RX ADMIN — TIOTROPIUM BROMIDE INHALATION SPRAY 2 PUFF(S): 3.12 SPRAY, METERED RESPIRATORY (INHALATION) at 11:52

## 2025-05-13 RX ADMIN — Medication 2 PUFF(S): at 06:06

## 2025-05-13 RX ADMIN — LEVETIRACETAM 1000 MILLIGRAM(S): 10 INJECTION, SOLUTION INTRAVENOUS at 06:05

## 2025-05-13 RX ADMIN — HEPARIN SODIUM 10.5 UNIT(S)/HR: 1000 INJECTION INTRAVENOUS; SUBCUTANEOUS at 09:11

## 2025-05-13 RX ADMIN — Medication 1 DOSE(S): at 06:05

## 2025-05-13 RX ADMIN — Medication 81 MILLIGRAM(S): at 11:55

## 2025-05-13 RX ADMIN — LEVETIRACETAM 1000 MILLIGRAM(S): 10 INJECTION, SOLUTION INTRAVENOUS at 17:30

## 2025-05-13 RX ADMIN — HEPARIN SODIUM 10.5 UNIT(S)/HR: 1000 INJECTION INTRAVENOUS; SUBCUTANEOUS at 19:07

## 2025-05-13 RX ADMIN — Medication 75 MICROGRAM(S): at 06:06

## 2025-05-13 RX ADMIN — Medication 1 DOSE(S): at 17:29

## 2025-05-13 RX ADMIN — ATORVASTATIN CALCIUM 40 MILLIGRAM(S): 80 TABLET, FILM COATED ORAL at 21:55

## 2025-05-13 NOTE — PROGRESS NOTE ADULT - SUBJECTIVE AND OBJECTIVE BOX
Patient is a 84y old  Male who presents with a chief complaint of chest pain (12 May 2025 12:03)      SUBJECTIVE / OVERNIGHT EVENTS: Patient seen and examined at bedside. No acute events overnight.    MEDICATIONS  (STANDING):  aspirin enteric coated 81 milliGRAM(s) Oral daily  fluticasone propionate/ salmeterol 100-50 MICROgram(s) Diskus 1 Dose(s) Inhalation two times a day  heparin  Infusion 1050 Unit(s)/Hr (10.5 mL/Hr) IV Continuous <Continuous>  levETIRAcetam 1000 milliGRAM(s) Oral two times a day  levothyroxine 75 MICROGram(s) Oral daily  tiotropium 2.5 MICROgram(s) Inhaler 2 Puff(s) Inhalation daily    MEDICATIONS  (PRN):  albuterol    90 MICROgram(s) HFA Inhaler 2 Puff(s) Inhalation every 6 hours PRN for shortness of breath and/or wheezing      Vital Signs Last 24 Hrs  T(C): 36.4 (13 May 2025 04:22), Max: 36.7 (12 May 2025 10:53)  T(F): 97.5 (13 May 2025 04:22), Max: 98 (12 May 2025 10:53)  HR: 70 (13 May 2025 04:22) (64 - 70)  BP: 158/75 (13 May 2025 04:22) (129/72 - 158/75)  BP(mean): --  RR: 18 (13 May 2025 04:22) (18 - 18)  SpO2: 95% (13 May 2025 04:22) (94% - 96%)    Parameters below as of 13 May 2025 04:22  Patient On (Oxygen Delivery Method): nasal cannula  O2 Flow (L/min): 2    CAPILLARY BLOOD GLUCOSE        I&O's Summary    12 May 2025 07:01  -  13 May 2025 07:00  --------------------------------------------------------  IN: 820 mL / OUT: 450 mL / NET: 370 mL        PHYSICAL EXAM:  GENERAL: NAD, resting comfortably in bed  HEAD:  Atraumatic, Normocephalic  EYES: EOMI, PERRLA, conjunctiva and sclera clear  NECK: Supple, No JVD  CHEST/LUNG: Clear to auscultation bilaterally; No wheeze  HEART: Regular rate and rhythm; No murmurs, rubs, or gallops  ABDOMEN: Soft, Nontender, Nondistended; Bowel sounds present  EXTREMITIES:  2+ Peripheral Pulses, No clubbing, cyanosis, or edema  PSYCH: AAOx3  NEUROLOGY: non-focal  SKIN: No rashes or lesions    LABS:                        15.4   6.03  )-----------( 141      ( 13 May 2025 06:38 )             48.0     05-13    141  |  105  |  16  ----------------------------<  83  4.0   |  24  |  0.65    Ca    8.6      13 May 2025 06:40  Phos  2.5     05-13  Mg     2.0     05-13    TPro  7.2  /  Alb  3.9  /  TBili  0.5  /  DBili  x   /  AST  24  /  ALT  19  /  AlkPhos  64  05-11    PT/INR - ( 11 May 2025 11:04 )   PT: 11.6 sec;   INR: 1.01 ratio         PTT - ( 13 May 2025 01:21 )  PTT:47.2 sec  CARDIAC MARKERS ( 11 May 2025 11:04 )  x     / x     / x     / x     / 4.0 ng/mL      Urinalysis Basic - ( 13 May 2025 06:40 )    Color: x / Appearance: x / SG: x / pH: x  Gluc: 83 mg/dL / Ketone: x  / Bili: x / Urobili: x   Blood: x / Protein: x / Nitrite: x   Leuk Esterase: x / RBC: x / WBC x   Sq Epi: x / Non Sq Epi: x / Bacteria: x        RADIOLOGY & ADDITIONAL TESTS:    Imaging Personally Reviewed:    Consultant(s) Notes Reviewed:      Care Discussed with Consultants/Other Providers:    Brielle Elliott MD, Internal Medicine Resident     Patient is a 84y old  Male who presents with a chief complaint of chest pain (12 May 2025 12:03)      SUBJECTIVE / OVERNIGHT EVENTS: Patient seen and examined at bedside. No acute events overnight. Trops were trended to peak at 4118. Pt continues to deny any headaches, chest pain, SOB, or issues w/ walking.     MEDICATIONS  (STANDING):  aspirin enteric coated 81 milliGRAM(s) Oral daily  fluticasone propionate/ salmeterol 100-50 MICROgram(s) Diskus 1 Dose(s) Inhalation two times a day  heparin  Infusion 1050 Unit(s)/Hr (10.5 mL/Hr) IV Continuous <Continuous>  levETIRAcetam 1000 milliGRAM(s) Oral two times a day  levothyroxine 75 MICROGram(s) Oral daily  tiotropium 2.5 MICROgram(s) Inhaler 2 Puff(s) Inhalation daily    MEDICATIONS  (PRN):  albuterol    90 MICROgram(s) HFA Inhaler 2 Puff(s) Inhalation every 6 hours PRN for shortness of breath and/or wheezing      Vital Signs Last 24 Hrs  T(C): 36.4 (13 May 2025 04:22), Max: 36.7 (12 May 2025 10:53)  T(F): 97.5 (13 May 2025 04:22), Max: 98 (12 May 2025 10:53)  HR: 70 (13 May 2025 04:22) (64 - 70)  BP: 158/75 (13 May 2025 04:22) (129/72 - 158/75)  BP(mean): --  RR: 18 (13 May 2025 04:22) (18 - 18)  SpO2: 95% (13 May 2025 04:22) (94% - 96%)    Parameters below as of 13 May 2025 04:22  Patient On (Oxygen Delivery Method): nasal cannula  O2 Flow (L/min): 2    CAPILLARY BLOOD GLUCOSE        I&O's Summary    12 May 2025 07:01  -  13 May 2025 07:00  --------------------------------------------------------  IN: 820 mL / OUT: 450 mL / NET: 370 mL        PHYSICAL EXAM:  GENERAL: NAD, resting comfortably in bed  HEAD:  Atraumatic, Normocephalic  EYES: EOMI, PERRLA, conjunctiva and sclera clear  NECK: Supple, No JVD  CHEST/LUNG: Decreased breath sounds bilaterally; No wheeze  HEART: Regular rate and rhythm; No murmurs, rubs, or gallops  ABDOMEN: Soft, Nontender, Nondistended; Bowel sounds present  EXTREMITIES: Pitting edema in b/l LEs much improved from yesterday - minimal today; 2+ Peripheral Pulses, No clubbing, cyanosis  PSYCH: AAOx2 (oriented to person, place, but not time - at baseline)  NEUROLOGY: non-focal  SKIN: No rashes or lesions    LABS:                        15.4   6.03  )-----------( 141      ( 13 May 2025 06:38 )             48.0     05-13    141  |  105  |  16  ----------------------------<  83  4.0   |  24  |  0.65    Ca    8.6      13 May 2025 06:40  Phos  2.5     05-13  Mg     2.0     05-13    TPro  7.2  /  Alb  3.9  /  TBili  0.5  /  DBili  x   /  AST  24  /  ALT  19  /  AlkPhos  64  05-11    PT/INR - ( 11 May 2025 11:04 )   PT: 11.6 sec;   INR: 1.01 ratio         PTT - ( 13 May 2025 01:21 )  PTT:47.2 sec  CARDIAC MARKERS ( 11 May 2025 11:04 )  x     / x     / x     / x     / 4.0 ng/mL      Urinalysis Basic - ( 13 May 2025 06:40 )    Color: x / Appearance: x / SG: x / pH: x  Gluc: 83 mg/dL / Ketone: x  / Bili: x / Urobili: x   Blood: x / Protein: x / Nitrite: x   Leuk Esterase: x / RBC: x / WBC x   Sq Epi: x / Non Sq Epi: x / Bacteria: x        RADIOLOGY & ADDITIONAL TESTS:    Imaging Personally Reviewed:    Consultant(s) Notes Reviewed:      Care Discussed with Consultants/Other Providers:    Brielle Elliott MD, Internal Medicine Resident     Patient is a 84y old  Male who presents with a chief complaint of chest pain (12 May 2025 12:03)      SUBJECTIVE / OVERNIGHT EVENTS: Patient seen and examined at bedside. No acute events overnight. Trops were trended to peak at 4118. Pt continues to deny any headaches, chest pain, SOB, or issues w/ walking.     MEDICATIONS  (STANDING):  aspirin enteric coated 81 milliGRAM(s) Oral daily  fluticasone propionate/ salmeterol 100-50 MICROgram(s) Diskus 1 Dose(s) Inhalation two times a day  heparin  Infusion 1050 Unit(s)/Hr (10.5 mL/Hr) IV Continuous <Continuous>  levETIRAcetam 1000 milliGRAM(s) Oral two times a day  levothyroxine 75 MICROGram(s) Oral daily  tiotropium 2.5 MICROgram(s) Inhaler 2 Puff(s) Inhalation daily    MEDICATIONS  (PRN):  albuterol    90 MICROgram(s) HFA Inhaler 2 Puff(s) Inhalation every 6 hours PRN for shortness of breath and/or wheezing      Vital Signs Last 24 Hrs  T(C): 36.4 (13 May 2025 04:22), Max: 36.7 (12 May 2025 10:53)  T(F): 97.5 (13 May 2025 04:22), Max: 98 (12 May 2025 10:53)  HR: 70 (13 May 2025 04:22) (64 - 70)  BP: 158/75 (13 May 2025 04:22) (129/72 - 158/75)  BP(mean): --  RR: 18 (13 May 2025 04:22) (18 - 18)  SpO2: 95% (13 May 2025 04:22) (94% - 96%)    Parameters below as of 13 May 2025 04:22  Patient On (Oxygen Delivery Method): nasal cannula  O2 Flow (L/min): 2    CAPILLARY BLOOD GLUCOSE        I&O's Summary    12 May 2025 07:01  -  13 May 2025 07:00  --------------------------------------------------------  IN: 820 mL / OUT: 450 mL / NET: 370 mL        PHYSICAL EXAM:  GENERAL: NAD, resting comfortably in bed  HEAD:  Atraumatic, Normocephalic  EYES: EOMI, PERRLA, conjunctiva and sclera clear  NECK: Supple, No JVD  CHEST/LUNG: Decreased breath sounds bilaterally; No wheeze  HEART: Regular rate and rhythm; No murmurs, rubs, or gallops  ABDOMEN: Soft, Nontender, Nondistended; Bowel sounds present  EXTREMITIES: +1 pitting edema in b/l LEs; 2+ Peripheral Pulses, No clubbing, cyanosis  PSYCH: AAOx2 (oriented to person, place, but not time - at baseline)  NEUROLOGY: non-focal  SKIN: No rashes or lesions    LABS:                        15.4   6.03  )-----------( 141      ( 13 May 2025 06:38 )             48.0     05-13    141  |  105  |  16  ----------------------------<  83  4.0   |  24  |  0.65    Ca    8.6      13 May 2025 06:40  Phos  2.5     05-13  Mg     2.0     05-13    TPro  7.2  /  Alb  3.9  /  TBili  0.5  /  DBili  x   /  AST  24  /  ALT  19  /  AlkPhos  64  05-11    PT/INR - ( 11 May 2025 11:04 )   PT: 11.6 sec;   INR: 1.01 ratio         PTT - ( 13 May 2025 01:21 )  PTT:47.2 sec  CARDIAC MARKERS ( 11 May 2025 11:04 )  x     / x     / x     / x     / 4.0 ng/mL      Urinalysis Basic - ( 13 May 2025 06:40 )    Color: x / Appearance: x / SG: x / pH: x  Gluc: 83 mg/dL / Ketone: x  / Bili: x / Urobili: x   Blood: x / Protein: x / Nitrite: x   Leuk Esterase: x / RBC: x / WBC x   Sq Epi: x / Non Sq Epi: x / Bacteria: x        RADIOLOGY & ADDITIONAL TESTS:    Imaging Personally Reviewed:    Consultant(s) Notes Reviewed:      Care Discussed with Consultants/Other Providers:    Brielle Elliott MD, Internal Medicine Resident
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
## 2025-05-13 NOTE — DISCHARGE NOTE PROVIDER - CARE PROVIDER_API CALL
Selvin Hansen  Internal Medicine  [address] [place] [address]  Phone: [phone]  Fax: [phone]  Established Patient  Follow Up Time: 1 week   Selvin Hansen  Internal Medicine  [address] [place] [address]  Phone: [phone]  Fax: [phone]  Established Patient  Follow Up Time: 1 week    Nalini Mckeon  Cardiology  [address] [facility], [facility] [address]  [place] [address]  Phone: [phone]  Fax: [phone]  Follow Up Time: 2 weeks   Selvin Hansen  Internal Medicine  [address] [place] [address]  Phone: [phone]  Fax: [phone]  Established Patient  Follow Up Time: 1 week    Nalini Mckeon  Cardiology  [address] [facility], [facility] [address]  [place] [address]  Phone: [phone]  Fax: [phone]  Follow Up Time: 1 week

## 2025-05-13 NOTE — PROGRESS NOTE ADULT - PROBLEM SELECTOR PLAN 1
- EKG showing CESAR in V3-V5, TTE EF 62% w/o RWMA or change in LV function but showing RV dysfunction  - trops on admission 18 -> 27 -> 69 -> 187 -> 882 -> 1881    Plan:  - neurosx recs appreciated - no absolute contraindication to start A/C  - cards recs appreciated - ok w/ starting hep gtt (no bolus) while waiting for CTA chest for empiric coverage of PE and NSTEMI  - f/u CTA chest to r/o PE  - if no PE, then plan for LHC AFTER MRI and neurosurgery input taking into account MRI brain   - if PE present, given troponin elevation and RV failure would have evaluated for direct lytics   - if patient shows evidence of decompensation, low threshold for ICU eval  - ASA 81mg QD, hold off on P2Y12 inhibitor until MR head done  - neuro checks q4h  - once aPTT therapeutic, repeat CT head  - monitor on tele  - trend trops until peak - EKG showing CESAR in V3-V5, TTE EF 62% w/o RWMA or change in LV function but showing RV dysfunction  - trops on admission 18 -> 27 -> 69 -> 187 -> 882 -> 1881    Plan:  - neurosx recs appreciated - no absolute contraindication to start A/C  - cards recs appreciated - ok w/ starting hep gtt (no bolus) while waiting for CTA chest for empiric coverage of PE and NSTEMI  - CTA chest (5/13) (-) for PE  - possible plan for C AFTER MRI and neurosurgery input taking into account MRI brain   - if patient shows evidence of decompensation, low threshold for ICU eval  - ASA 81mg QD, atorvastatin 40mg QHS  - neuro checks q4h  - monitor on tele  - trops trended until peak (6104 5/13)

## 2025-05-13 NOTE — DISCHARGE NOTE PROVIDER - PROVIDER TOKENS
PROVIDER:[TOKEN:[[id number]],FOLLOWUP:[1 week],ESTABLISHEDPATIENT:[T]] PROVIDER:[TOKEN:[[id number]],FOLLOWUP:[1 week],ESTABLISHEDPATIENT:[T]],PROVIDER:[TOKEN:[[id number]],FOLLOWUP:[2 weeks]] PROVIDER:[TOKEN:[[id number]],FOLLOWUP:[1 week],ESTABLISHEDPATIENT:[T]],PROVIDER:[TOKEN:[[id number]],FOLLOWUP:[1 week]]

## 2025-05-13 NOTE — DISCHARGE NOTE PROVIDER - NSDCMRMEDTOKEN_GEN_ALL_CORE_FT
albuterol 90 mcg/inh inhalation aerosol: 2 puff(s) inhaled every 6 hours as needed for  shortness of breath and/or wheezing  Keppra 1000 mg oral tablet: 1 tab(s) orally 2 times a day  levothyroxine 75 mcg (0.075 mg) oral capsule: 1 cap(s) orally once a day  losartan 25 mg oral tablet: 1 tab(s) orally once a day  Norvasc 5 mg oral tablet: 1 tab(s) orally once a day  Spiriva 18 mcg inhalation capsule: 1 cap(s) inhaled once a day  Wixela Inhub 100 mcg-50 mcg/inh inhalation powder: 1 inhaled 2 times a day   albuterol 90 mcg/inh inhalation aerosol: 2 puff(s) inhaled every 6 hours as needed for  shortness of breath and/or wheezing  aspirin 81 mg oral delayed release tablet: 1 tab(s) orally once a day  atorvastatin 40 mg oral tablet: 1 tab(s) orally once a day (at bedtime)  Keppra 1000 mg oral tablet: 1 tab(s) orally 2 times a day  levothyroxine 75 mcg (0.075 mg) oral capsule: 1 cap(s) orally once a day  losartan 50 mg oral tablet: 1 tab(s) orally once a day  metoprolol succinate 25 mg oral tablet, extended release: 1 tab(s) orally once a day  Norvasc 5 mg oral tablet: 1 tab(s) orally once a day  Spiriva 18 mcg inhalation capsule: 1 cap(s) inhaled once a day   albuterol 90 mcg/inh inhalation aerosol: 2 puff(s) inhaled every 6 hours as needed for  shortness of breath and/or wheezing  aspirin 81 mg oral delayed release tablet: 1 tab(s) orally once a day  atorvastatin 40 mg oral tablet: 1 tab(s) orally once a day (at bedtime)  Keppra 1000 mg oral tablet: 1 tab(s) orally 2 times a day  levothyroxine 75 mcg (0.075 mg) oral capsule: 1 cap(s) orally once a day  losartan 50 mg oral tablet: 1 tab(s) orally once a day  metoprolol succinate 25 mg oral tablet, extended release: 1 tab(s) orally once a day  Norvasc 5 mg oral tablet: 1 tab(s) orally once a day  Spiriva 18 mcg inhalation capsule: 1 cap(s) inhaled once a day  Wixela 100-50mcg inhalation powder: 1 puff twice a day   albuterol 90 mcg/inh inhalation aerosol: 2 puff(s) inhaled every 6 hours as needed for  shortness of breath and/or wheezing  aspirin 81 mg oral delayed release tablet: 1 tab(s) orally once a day  atorvastatin 40 mg oral tablet: 1 tab(s) orally once a day (at bedtime)  Keppra 1000 mg oral tablet: 1 tab(s) orally 2 times a day  levothyroxine 75 mcg (0.075 mg) oral tablet: 1 tab(s) orally once a day  losartan 50 mg oral tablet: 1 tab(s) orally once a day  metoprolol succinate 25 mg oral tablet, extended release: 1 tab(s) orally once a day  Norvasc 5 mg oral tablet: 1 tab(s) orally once a day  Spiriva 18 mcg inhalation capsule: 1 cap(s) inhaled once a day  Wixela 100-50mcg inhalation powder: 1 puff twice a day

## 2025-05-13 NOTE — CHART NOTE - NSCHARTNOTEFT_GEN_A_CORE
Pt w/ known history of Lt IPH in 2006 s/p Crani for evac. No cute IPH.  MRI w/ Lt frontoparietal enhancement/ FLAIR signal, read as likely sequelae of prior IPH in the area, but also cannot r/o possible underlying dural AVF.  Patient is DNR/DNI. Defer AP/ AC clearance to cranial nsgy (Dr Tillman) Pt w/ known history of Lt IPH in 2006 s/p Crani for evac. No cute IPH.  MRI w/ Lt frontoparietal enhancement/ FLAIR signal, read as likely sequelae of prior IPH in the area, but also cannot r/o possible underlying dural AVF.  Patient and family against aggressive interventions. Defer AP/ AC clearance to cranial nsgy (Dr Tillman) Pt w/ known history of Lt IPH in 2006 s/p Crani for evac. No cute IPH.  MRI w/ Lt frontoparietal enhancement/ FLAIR signal, read as likely sequelae of prior IPH in the area, but also cannot r/o possible underlying dural AVF.  Patient and family against aggressive interventions. Given advanced age, poor baseline and family wishes, it's reasonable to proceed with a/c despite the very low likelihood that there is a possible underlying vascular lesion. While this uncertainty may pose some degree of risk, the risks of holding a/c indefinitely likely outweigh this risk.

## 2025-05-13 NOTE — PROGRESS NOTE ADULT - SUBJECTIVE AND OBJECTIVE BOX
CARDIOLOGY ATTENDING PROGRESS NOTE     S:    Denies chest pain or SOB. CTA chest negative for PE    O:    PE  Vital Signs Last 24 Hrs  T(C): 36.6 (13 May 2025 10:52), Max: 36.7 (12 May 2025 20:35)  T(F): 97.9 (13 May 2025 10:52), Max: 98 (12 May 2025 20:35)  HR: 74 (13 May 2025 10:52) (65 - 74)  BP: 126/70 (13 May 2025 10:52) (126/70 - 158/75)  BP(mean): --  RR: 18 (13 May 2025 10:52) (18 - 18)  SpO2: 95% (13 May 2025 10:52) (95% - 96%)    Parameters below as of 13 May 2025 10:52  Patient On (Oxygen Delivery Method): nasal cannula  O2 Flow (L/min): 2    Gen: NAD, breathing comfortably  HEENT: MMM, anicteric sclera  Neck: JVP < 10 cm  Cardiac: RRR  Lungs: decreased breath sounds bilaterally  Abd: soft, NT/ND. +BS  Ext: warm and well perfused. No significant LE edema    Data:  I have personally reviewed all data:  Labs reviewed    CXR  IMPRESSION:  Hazy opacity at the lung bases possibly representing atelectasis.   Otherwise clear lungs.    MRI brain   IMPRESSION:  -Postsurgical changes seen bilaterally in this patient with history of   multiple prior hemorrhage evacuations.  -Areas of sulcal hyperenhancement/hypervascularity in the left frontal   and parietal lobes with sulcal effacement and surrounding FLAIR   hyperintensity. Differential includes sequelae of prior   parenchymal/subdural hemorrhages and/or underlying dural arteriovenous   fistula. Neuro-interventional consultation should be considered for   further evaluation.    -No discrete mass lesion is identified.    EK25: sinus rhythm with 1st AV block. IVCD. Possible inferior infarct  25: sinus rhythm. LAD. ST elevation consider acute inferior infarct    Echo:  personally reviewed. Enlarged RV with reduced systolic function. Free wall hypokinesis with relative preservation of the RV apex. Possible Cowart's sign  grossly low normal LVEF 50-55%. No inferior wall hypokinesis noted    Meds   MEDICATIONS  (STANDING):  aspirin enteric coated 81 milliGRAM(s) Oral daily  atorvastatin 40 milliGRAM(s) Oral at bedtime  fluticasone propionate/ salmeterol 100-50 MICROgram(s) Diskus 1 Dose(s) Inhalation two times a day  heparin  Infusion 1050 Unit(s)/Hr (10.5 mL/Hr) IV Continuous <Continuous>  levETIRAcetam 1000 milliGRAM(s) Oral two times a day  levothyroxine 75 MICROGram(s) Oral daily  tiotropium 2.5 MICROgram(s) Inhaler 2 Puff(s) Inhalation daily    Tele: 15 beat episode of NSVT    A/P    CLAYTON SCOTT is a 84y Male with a history intracranial bleeding, expressive aphasia, HTN who presented with chest pain with radiation down the left arm. He received 325 mg x 1 load of ASA. TTE showed RV failure and possible Cowart's sign    #NSTEMI with concern for RV infarction, cannot exclude aborted inferior STEMI  #acute hypoxemic respiratory failure  #possible brain mass, history intracranial bleeding  #NSVT   #1st AV block  #HTN    CTA chest without evidence of PE. MRI brain with findings that radiology suggests neuro-interventional consultation  -now that MRI performed, need neurosurgery (or other neuro interventional service as deemed appropriate) for input on feasibility of short and long term dual antiplatelet therapy, taking into account MRI brain findings  -if able to proceed to [facility] then will discuss with interventional cardiology and patient family  --s/p  x 1 previously per charting. Continue ASA 81 mg daily. No P2Y12 inhibitor given the absence of clarity on MRI brain findings with  regards to bleeding risk and history of brain bleed. If able to proceed to [facility] as above, P2Y12 can be given at the time of LHC/PCI were this to be necessary. I believe risk/benefit continues not to favor giving upfront at this time.   -tolerating heparin gtt. Would continue for treatment of NSTEMI for 48 hours  -continue high intensity statin   -NSVT noted but giving beta blockers potentially worrisome due to evidence of RV failure on TTE-would hold off  -if the patient shows evidence of decompensation, low threshold for ICU evaluation    We will continue to follow with you. Thank you for allowing us to participate in this patient's care.    Nalini Mckeon MD  [facility]    For [facility] and Cardiovascular Surgery  service contact information, please go to [email or web address] ("[email or web address]" to login)

## 2025-05-13 NOTE — DISCHARGE NOTE PROVIDER - HOSPITAL COURSE
6 Hospital course:  Pt is 84 y.o. M w/ PMH of right SDH s/p evac. (2006) at [facility], left IPH s/p evac./hemicrani/plasty (2009) c/b expressive aphasia @[facility], COPD, hypothyroidism, HTN who presents for one day of chest pain. Overnight, pt had mild intermittent L-sided chest pain radiating down his L arm that was waking him up several times throughout the night. Pt's wife also noted that over the past week, pt has been having more SOB requiring usage of his albuterol inhaler. After eating breakfast, pt's wife noticed that pt was very weak but responsive. Pt then began having worsening chest pain, so pt's wife called EMS. Pt received nitroglycerin and 325mg of ASA via EMS. Pt denied any headaches, dizziness, pain w/ inspiration, abdominal pain, N/V, diarrhea, or constipation.    In the ED, pt was HDS, /63, HR 66, afebrile, but required 2LNC. EKG showed CESAR in V3-V5 w/o reciprocal changes. Trops 18 -> 27 -> 69. TTE w/o RWMA or change in LV function but notable for RV dysfunction, concerning for possible PE. CTH w/o acute hemorrhage, but shows L frontal encephalomalacia c/w known IPH and suspicion for underlying mass in L frontoparietal lobes. CXR showed atelectasis. At time of exam, pt denied any chest pain or SOB; pt satting well on RA. As per pt's wife, pt regularly has MRI and MRA of head done w/ neurologist but w/o contrast as per pt's wife's request. Last MRI and MRA head w/o contrast in 2/2025 showed no acute changes as per wife.    Pt had CTA chest that did not show evidence of PE. Pt also had MR head waw contrast that showed possible sequelae of prev. SDH/IPH and/or dural AV fistula.    The patient is afebrile, hemodynamically stable and medically optimized for discharge to home with follow up with PCP, Cardiology. On day of discharge, patient is clinically stable with no new exam findings or acute symptoms compared to prior. The patient was seen by the attending physician on the date of discharge and deemed stable and acceptable for discharge. The patient's chronic medical conditions were treated accordingly per the patient's home medication regimen. The patient's medication reconciliation (with changes made to chronic medications), follow up appointments, discharge orders, instructions, and significant lab and diagnostic studies are as noted.    Important Medication Changes and Reason:      Active or Pending Issues Requiring Follow-up:  RUL nodular opacity, LLL solid nodule in lungs - f/u w/ PCP, repeat CT chest in 1-3 mo.    Advanced Directives:   [X] Full code  [ ] DNR  [ ] Hospice       Hospital course:  Pt is 84 y.o. M w/ PMH of right SDH s/p evac. (2006) at [facility], left IPH s/p evac./hemicrani/plasty (2009) c/b expressive aphasia @[facility], COPD, hypothyroidism, HTN who presents for one day of chest pain. Overnight, pt had mild intermittent L-sided chest pain radiating down his L arm that was waking him up several times throughout the night. Pt's wife also noted that over the past week, pt has been having more SOB requiring usage of his albuterol inhaler. After eating breakfast, pt's wife noticed that pt was very weak but responsive. Pt then began having worsening chest pain, so pt's wife called EMS. Pt received nitroglycerin and 325mg of ASA via EMS. Pt denied any headaches, dizziness, pain w/ inspiration, abdominal pain, N/V, diarrhea, or constipation.    In the ED, pt was HDS, /63, HR 66, afebrile, but required 2LNC. EKG showed CESAR in V3-V5 w/o reciprocal changes. Trops 18 -> 27 -> 69. TTE w/o RWMA or change in LV function but notable for RV dysfunction, concerning for possible PE. CTH w/o acute hemorrhage, but shows L frontal encephalomalacia c/w known IPH and suspicion for underlying mass in L frontoparietal lobes. CXR showed atelectasis. At time of exam, pt denied any chest pain or SOB; pt satting well on RA. As per pt's wife, pt regularly has MRI and MRA of head done w/ neurologist but w/o contrast as per pt's wife's request. Last MRI and MRA head w/o contrast in 2/2025 showed no acute changes as per wife.    Pt had CTA chest that did not show evidence of PE. Pt also had MR head waw contrast that showed possible sequelae of prev. SDH/IPH and/or dural AV fistula - no intervention needed at this time.    The patient is afebrile, hemodynamically stable and medically optimized for discharge to home with follow up with PCP, Cardiology. On day of discharge, patient is clinically stable with no new exam findings or acute symptoms compared to prior. The patient was seen by the attending physician on the date of discharge and deemed stable and acceptable for discharge. The patient's chronic medical conditions were treated accordingly per the patient's home medication regimen. The patient's medication reconciliation (with changes made to chronic medications), follow up appointments, discharge orders, instructions, and significant lab and diagnostic studies are as noted.    Important Medication Changes and Reason:  stopped amlodipine 5mg QD and losartan 25mg QHS  started atorvastatin 40mg QHS for CAD    Active or Pending Issues Requiring Follow-up:  RUL nodular opacity, LLL solid nodule in lungs - f/u w/ PCP/Pulmonology, repeat CT chest in 1-3 mo.  CAD - f/u w/ Cardiology    Advanced Directives:   [X] Full code  [ ] DNR  [ ] Hospice       Hospital course:  Pt is 84 y.o. M w/ PMH of right SDH s/p evac. (2006) at [facility], left IPH s/p evac./hemicrani/plasty (2009) c/b expressive aphasia @[facility], COPD, hypothyroidism, HTN who presents for one day of chest pain. Overnight, pt had mild intermittent L-sided chest pain radiating down his L arm that was waking him up several times throughout the night. Pt's wife also noted that over the past week, pt has been having more SOB requiring usage of his albuterol inhaler. After eating breakfast, pt's wife noticed that pt was very weak but responsive. Pt then began having worsening chest pain, so pt's wife called EMS. Pt received nitroglycerin and 325mg of ASA via EMS. Pt denied any headaches, dizziness, pain w/ inspiration, abdominal pain, N/V, diarrhea, or constipation.    In the ED, pt was HDS, /63, HR 66, afebrile, but required 2LNC. EKG showed CESAR in V3-V5 w/o reciprocal changes. Trops 18 -> 27 -> 69. TTE w/o RWMA or change in LV function but notable for RV dysfunction, concerning for possible PE. CTH w/o acute hemorrhage, but shows L frontal encephalomalacia c/w known IPH and suspicion for underlying mass in L frontoparietal lobes. CXR showed atelectasis. At time of exam, pt denied any chest pain or SOB; pt satting well on RA. As per pt's wife, pt regularly has MRI and MRA of head done w/ neurologist but w/o contrast as per pt's wife's request. Last MRI and MRA head w/o contrast in 2/2025 showed no acute changes as per wife.    Pt had CTA chest that did not show evidence of PE. Pt also had MR head waw contrast that showed possible sequelae of prev. SDH/IPH and/or dural AV fistula - no intervention needed at this time.    The patient is afebrile, hemodynamically stable and medically optimized for discharge to home with follow up with PCP, Cardiology, Neurology. On day of discharge, patient is clinically stable with no new exam findings or acute symptoms compared to prior. The patient was seen by the attending physician on the date of discharge and deemed stable and acceptable for discharge. The patient's chronic medical conditions were treated accordingly per the patient's home medication regimen. The patient's medication reconciliation (with changes made to chronic medications), follow up appointments, discharge orders, instructions, and significant lab and diagnostic studies are as noted.    Important Medication Changes and Reason:  stopped amlodipine 5mg QD and losartan 25mg QHS  started atorvastatin 40mg QHS for CAD    Active or Pending Issues Requiring Follow-up:  RUL nodular opacity, LLL solid nodule in lungs - f/u w/ PCP/Pulmonology, repeat CT chest in 1-3 mo.  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 M w/ PMH of right SDH s/p evac. (2006) at [facility], left IPH s/p evac./hemicrani/plasty (2009) c/b expressive aphasia @[facility], COPD, hypothyroidism, HTN who presents for one day of chest pain. Overnight, pt had mild intermittent L-sided chest pain radiating down his L arm that was waking him up several times throughout the night. Pt's wife also noted that over the past week, pt has been having more SOB requiring usage of his albuterol inhaler. After eating breakfast, pt's wife noticed that pt was very weak but responsive. Pt then began having worsening chest pain, so pt's wife called EMS. Pt received nitroglycerin and 325mg of ASA via EMS. Pt denied any headaches, dizziness, pain w/ inspiration, abdominal pain, N/V, diarrhea, or constipation.    In the ED, pt was HDS, /63, HR 66, afebrile, but required 2LNC. EKG showed CESAR in V3-V5 w/o reciprocal changes. Trops 18 -> 27 -> 69. TTE w/o RWMA or change in LV function but notable for RV dysfunction, concerning for possible PE. CTH w/o acute hemorrhage, but shows L frontal encephalomalacia c/w known IPH and suspicion for underlying mass in L frontoparietal lobes. CXR showed atelectasis. At time of exam, pt denied any chest pain or SOB; pt satting well on RA. As per pt's wife, pt regularly has MRI and MRA of head done w/ neurologist but w/o contrast as per pt's wife's request. Last MRI and MRA head w/o contrast in 2/2025 showed no acute changes as per wife.    Pt had CTA chest that did not show evidence of PE. Pt also had MR head waw contrast that showed possible sequelae of prev. SDH/IPH and/or dural AV fistula - no intervention needed at this time. After further discussion between the pt's family and Cardiology, and Interventional Cardiology, the family opted to not go for cardiac catheterization due to risk of potential bleed as pt would have to be started on DAPT if stent were to be placed during cath. As per Interventional Cardiology, pt was deemed too high risk for coronary angiography with possible intervention and that medical therapy only should be pursued at this time.    The patient is afebrile, hemodynamically stable and medically optimized for discharge to home with follow up with PCP, Cardiology, Neurology. On day of discharge, patient is clinically stable with no new exam findings or acute symptoms compared to prior. The patient was seen by the attending physician on the date of discharge and deemed stable and acceptable for discharge. The patient's chronic medical conditions were treated accordingly per the patient's home medication regimen. The patient's medication reconciliation (with changes made to chronic medications), follow up appointments, discharge orders, instructions, and significant lab and diagnostic studies are as noted.    Important Medication Changes and Reason:  stopped amlodipine 5mg QD given normotension  started atorvastatin 40mg QHS, metoprolol succinate 25mg QD for CAD, NSTEMI, NSVT, pSVT    Active or Pending Issues Requiring Follow-up:  RUL nodular opacity, LLL solid nodule in lungs - f/u w/ PCP/Pulmonology, repeat CT chest in 1-3 mo.  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 M w/ PMH of right SDH s/p evac. (2006) at [facility], left IPH s/p evac./hemicrani/plasty (2009) c/b expressive aphasia @[facility], COPD, hypothyroidism, HTN who presents for one day of chest pain. Overnight, pt had mild intermittent L-sided chest pain radiating down his L arm that was waking him up several times throughout the night. Pt's wife also noted that over the past week, pt has been having more SOB requiring usage of his albuterol inhaler. After eating breakfast, pt's wife noticed that pt was very weak but responsive. Pt then began having worsening chest pain, so pt's wife called EMS. Pt received nitroglycerin and 325mg of ASA via EMS. Pt denied any headaches, dizziness, pain w/ inspiration, abdominal pain, N/V, diarrhea, or constipation.    In the ED, pt was HDS, /63, HR 66, afebrile, but required 2LNC. EKG showed CESAR in V3-V5 w/o reciprocal changes. Trops 18 -> 27 -> 69. TTE w/o RWMA or change in LV function but notable for RV dysfunction, concerning for possible PE. CTH w/o acute hemorrhage, but shows L frontal encephalomalacia c/w known IPH and suspicion for underlying mass in L frontoparietal lobes. CXR showed atelectasis. At time of exam, pt denied any chest pain or SOB; pt satting well on RA. As per pt's wife, pt regularly has MRI and MRA of head done w/ neurologist but w/o contrast as per pt's wife's request. Last MRI and MRA head w/o contrast in 2/2025 showed no acute changes as per wife.    Pt had CTA chest that did not show evidence of PE. Pt also had MR head waw contrast that showed possible sequelae of prev. SDH/IPH and/or dural AV fistula - no intervention needed at this time. After further discussion between the pt's family and Cardiology, and Interventional Cardiology, the family opted to not go for cardiac catheterization due to risk of potential bleed as pt would have to be started on DAPT if stent were to be placed during cath. As per Interventional Cardiology, pt was deemed too high risk for coronary angiography with possible intervention and that medical therapy only should be pursued at this time.    The patient is afebrile, hemodynamically stable and medically optimized for discharge to home with follow up with PCP, Cardiology, Neurology. On day of discharge, patient is clinically stable with no new exam findings or acute symptoms compared to prior. The patient was seen by the attending physician on the date of discharge and deemed stable and acceptable for discharge. The patient's chronic medical conditions were treated accordingly per the patient's home medication regimen. The patient's medication reconciliation (with changes made to chronic medications), follow up appointments, discharge orders, instructions, and significant lab and diagnostic studies are as noted.    Important Medication Changes and Reason:  started atorvastatin 40mg QHS, metoprolol succinate 25mg QD for CAD, NSTEMI, NSVT, pSVT    Active or Pending Issues Requiring Follow-up:  RUL nodular opacity, LLL solid nodule in lungs - f/u w/ PCP/Pulmonology, repeat CT chest in 1-3 mo.  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 M w/ PMH of right SDH s/p evac. (2006) at [facility], left IPH s/p evac./hemicrani/plasty (2009) c/b expressive aphasia @[facility], COPD, hypothyroidism, HTN who presents for one day of chest pain. Overnight, pt had mild intermittent L-sided chest pain radiating down his L arm that was waking him up several times throughout the night. Pt's wife also noted that over the past week, pt has been having more SOB requiring usage of his albuterol inhaler. After eating breakfast, pt's wife noticed that pt was very weak but responsive. Pt then began having worsening chest pain, so pt's wife called EMS. Pt received nitroglycerin and 325mg of ASA via EMS. Pt denied any headaches, dizziness, pain w/ inspiration, abdominal pain, N/V, diarrhea, or constipation.    In the ED, pt was HDS, /63, HR 66, afebrile, but required 2LNC. EKG showed CESAR in V3-V5 w/o reciprocal changes. Trops 18 -> 27 -> 69. TTE w/o RWMA or change in LV function but notable for RV dysfunction, concerning for possible PE. CTH w/o acute hemorrhage, but shows L frontal encephalomalacia c/w known IPH and suspicion for underlying mass in L frontoparietal lobes. CXR showed atelectasis. At time of exam, pt denied any chest pain or SOB; pt satting well on RA. As per pt's wife, pt regularly has MRI and MRA of head done w/ neurologist but w/o contrast as per pt's wife's request. Last MRI and MRA head w/o contrast in 2/2025 showed no acute changes as per wife.    Pt had CTA chest that did not show evidence of PE. Pt also had MR head waw contrast that showed possible sequelae of prev. SDH/IPH and/or dural AV fistula - no intervention needed at this time. After further discussion between the pt's family and Cardiology, and Interventional Cardiology, the family opted to not go for cardiac catheterization due to risk of potential bleed as pt would have to be started on DAPT if stent were to be placed during cath. As per Interventional Cardiology, pt was deemed too high risk for coronary angiography with possible intervention and that medical therapy only should be pursued at this time. Repeat TTE was performed which confirmed RV dysfunction, evidence of pHTN (PASP 55mmHg). Home losartan was increased from 25mg to 50mg QD; monitoring off diuretics. pHTN likely 2/2 longstanding COPD - pt requiring portable home O2 upon d/c (is on 2LNC PRN for exertion at home).    The patient is afebrile, hemodynamically stable and medically optimized for discharge to home with follow up with PCP, Cardiology, Neurology. On day of discharge, patient is clinically stable with no new exam findings or acute symptoms compared to prior. The patient was seen by the attending physician on the date of discharge and deemed stable and acceptable for discharge. The patient's chronic medical conditions were treated accordingly per the patient's home medication regimen. The patient's medication reconciliation (with changes made to chronic medications), follow up appointments, discharge orders, instructions, and significant lab and diagnostic studies are as noted.    Important Medication Changes and Reason:  started atorvastatin 40mg QHS, metoprolol succinate 25mg QD for CAD, NSTEMI, NSVT, pSVT  increased losartan from 25mg to 50mg QD for pHTN    Active or Pending Issues Requiring Follow-up:  RUL nodular opacity, LLL solid nodule in lungs - f/u w/ PCP/Pulmonology, repeat CT chest in 1-3 mo.  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 M w/ PMH of right SDH s/p evac. (2006) at [facility], left IPH s/p evac./hemicrani/plasty (2009) c/b expressive aphasia @[facility], COPD, hypothyroidism, HTN who presents for one day of chest pain. Overnight, pt had mild intermittent L-sided chest pain radiating down his L arm that was waking him up several times throughout the night. Pt's wife also noted that over the past week, pt has been having more SOB requiring usage of his albuterol inhaler. After eating breakfast, pt's wife noticed that pt was very weak but responsive. Pt then began having worsening chest pain, so pt's wife called EMS. Pt received nitroglycerin and 325mg of ASA via EMS. Pt denied any headaches, dizziness, pain w/ inspiration, abdominal pain, N/V, diarrhea, or constipation.    In the ED, pt was HDS, /63, HR 66, afebrile, but required 2LNC. EKG showed CESAR in V3-V5 w/o reciprocal changes. Trops 18 -> 27 -> 69. TTE w/o RWMA or change in LV function but notable for RV dysfunction, concerning for possible PE. CTH w/o acute hemorrhage, but shows L frontal encephalomalacia c/w known IPH and suspicion for underlying mass in L frontoparietal lobes. CXR showed atelectasis. At time of exam, pt denied any chest pain or SOB; pt satting well on RA. As per pt's wife, pt regularly has MRI and MRA of head done w/ neurologist but w/o contrast as per pt's wife's request. Last MRI and MRA head w/o contrast in 2/2025 showed no acute changes as per wife.    Pt had CTA chest that did not show evidence of PE. Pt also had MR head waw contrast that showed possible sequelae of prev. SDH/IPH and/or dural AV fistula - no intervention needed at this time. After further discussion between the pt's family and Cardiology, and Interventional Cardiology, the family opted to not go for cardiac catheterization due to risk of potential bleed as pt would have to be started on DAPT if stent were to be placed during cath. As per Interventional Cardiology, pt was deemed too high risk for coronary angiography with possible intervention and that medical therapy only should be pursued at this time. Repeat TTE was performed which confirmed RV dysfunction, evidence of pHTN (PASP 55mmHg). Home losartan was increased from 25mg to 50mg QD; monitoring off diuretics. pHTN likely 2/2 longstanding COPD - pt requiring portable home O2 upon d/c (is on 2LNC PRN for exertion at home).    The patient is afebrile, hemodynamically stable and medically optimized for discharge to home with follow up with PCP, Cardiology, Neurology. On day of discharge, patient is clinically stable with no new exam findings or acute symptoms compared to prior. The patient was seen by the attending physician on the date of discharge and deemed stable and acceptable for discharge. The patient's chronic medical conditions were treated accordingly per the patient's home medication regimen. The patient's medication reconciliation (with changes made to chronic medications), follow up appointments, discharge orders, instructions, and significant lab and diagnostic studies are as noted.    Important Medication Changes and Reason:  started atorvastatin 40mg QHS, metoprolol succinate 25mg QD for CAD, NSTEMI, NSVT, pSVT  increased losartan from 25mg to 50mg QD for pHTN    Active or Pending Issues Requiring Follow-up:  RUL nodular opacity, LLL solid nodule in lungs - f/u w/ PCP/Pulmonology, repeat CT chest in 1-3 mo.  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 M w/ PMH of right SDH s/p evac. (2006) at [facility], left IPH s/p evac./hemicrani/plasty (2009) c/b expressive aphasia @[facility], COPD, hypothyroidism, HTN who presents for one day of chest pain. Overnight, pt had mild intermittent L-sided chest pain radiating down his L arm that was waking him up several times throughout the night. Pt's wife also noted that over the past week, pt has been having more SOB requiring usage of his albuterol inhaler. After eating breakfast, pt's wife noticed that pt was very weak but responsive. Pt then began having worsening chest pain, so pt's wife called EMS. Pt received nitroglycerin and 325mg of ASA via EMS. Pt denied any headaches, dizziness, pain w/ inspiration, abdominal pain, N/V, diarrhea, or constipation.    In the ED, pt was HDS, /63, HR 66, afebrile, but required 2LNC. EKG showed CESAR in V3-V5 w/o reciprocal changes. Trops 18 -> 27 -> 69. TTE w/o RWMA or change in LV function but notable for RV dysfunction, concerning for possible PE. CTH w/o acute hemorrhage, but shows L frontal encephalomalacia c/w known IPH and suspicion for underlying mass in L frontoparietal lobes. CXR showed atelectasis. At time of exam, pt denied any chest pain or SOB; pt satting well on RA. As per pt's wife, pt regularly has MRI and MRA of head done w/ neurologist but w/o contrast as per pt's wife's request. Last MRI and MRA head w/o contrast in 2/2025 showed no acute changes as per wife.    Pt had CTA chest that did not show evidence of PE. Pt also had MR head waw contrast that showed possible sequelae of prev. SDH/IPH and/or dural AV fistula - no intervention needed at this time. After further discussion between the pt's family and Cardiology, and Interventional Cardiology, the family opted to not go for cardiac catheterization due to risk of potential bleed as pt would have to be started on DAPT if stent were to be placed during cath. As per Interventional Cardiology, pt was deemed too high risk for coronary angiography with possible intervention and that medical therapy only should be pursued at this time. Repeat TTE was performed which confirmed RV dysfunction, evidence of pHTN (PASP 55mmHg). Home losartan was increased from 25mg to 50mg QD; monitoring off diuretics. pHTN likely 2/2 longstanding COPD - pt requiring portable home O2 upon d/c (is on 2LNC PRN for exertion at home).    The patient is afebrile, hemodynamically stable and medically optimized for discharge to home with follow up with PCP, Cardiology, Neurology. On day of discharge, patient is clinically stable with no new exam findings or acute symptoms compared to prior. The patient was seen by the attending physician on the date of discharge and deemed stable and acceptable for discharge. The patient's chronic medical conditions were treated accordingly per the patient's home medication regimen. The patient's medication reconciliation (with changes made to chronic medications), follow up appointments, discharge orders, instructions, and significant lab and diagnostic studies are as noted.    Important Medication Changes and Reason:  started atorvastatin 40mg QHS, metoprolol succinate 25mg QD for CAD, NSTEMI, NSVT, pSVT  increased losartan from 25mg to 50mg QD for pHTN    Active or Pending Issues Requiring Follow-up:  RUL nodular opacity, LLL solid nodule in lungs - f/u w/ PCP/Pulmonology, repeat CT chest in 1-3 mo.  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

## 2025-05-13 NOTE — PROGRESS NOTE ADULT - ATTENDING COMMENTS
Patient is an 84 .year old male, with PMH of HTN, right SDH s/p evac. (2006), left IPH s/p evac./hemicrani/plasty (2009) c/b expressive aphasia @[facility] who presents for one day of chest pain. EKG- ST elevation in Inf-lateral leads, Trops 69-> 4118, cardiology evaluated.    1. Acute CESAR MI  2. H/O ICH, s/p craniotomy and evacuation 2006, 2009    - No c/o chest pain, repeat EKG showed ST elevation in inferolateral leads, Trops 69-> 4118, hemodynamically stable, Tele- NSR 70-80s, PVCs  - Echo normal LV function, no WMA  - Cardiology plans to c/w ASA, statin and heparin gtt for now, CTA neg PE. May need LHC if NeuroSx cleared for LHC and possible DAPT id stented  - MRI brain showed- -Postsurgical changes seen bilaterally in this patient with history of multiple prior hemorrhage evacuations. Areas of sulcal hyperenhancement/hypervascularity in the left frontal and parietal lobes with sulcal effacement and surrounding FLAIR hyperintensity. Differential includes sequelae of prior parenchymal/subdural hemorrhages and/or underlying dural arteriovenous fistula. No discrete mass lesion is identified.  - awaiting on NeuroSx plans  - Family ( wife) has reservation for any invasive procedure  - Tele    Spoke to wife at bedside  spent 50 min excluding house staff teaching .

## 2025-05-13 NOTE — DISCHARGE NOTE PROVIDER - NSDCFUADDAPPT_GEN_ALL_CORE_FT
APPTS ARE READY TO BE MADE: [ ] YES    Best Family or Patient Contact (if needed):    Additional Information about above appointments (if needed):    1: Cardiology  2: PCP  3:     Other comments or requests:    APPTS ARE READY TO BE MADE: [ ] YES    Best Family or Patient Contact (if needed):    Additional Information about above appointments (if needed):    1: Cardiology  2: PCP  3: Neurology    Other comments or requests:    APPTS ARE READY TO BE MADE: [X] YES    Best Family or Patient Contact (if needed): Iman Heath (wife) [phone]    Additional Information about above appointments (if needed):    1: Cardiology  2: PCP  3: Neurology (pt's neurologist is outside of [facility] - pt's wife will make appt)    Other comments or requests:    APPTS ARE READY TO BE MADE: [X] YES    Best Family or Patient Contact (if needed): Iman Heath (wife) [phone]    Additional Information about above appointments (if needed):    1: Cardiology, [facility]  2: PCP  3: Neurology (pt's neurologist is outside of [facility] - pt's wife will make appt)    Other comments or requests:    APPTS ARE READY TO BE MADE: [X] YES    Best Family or Patient Contact (if needed): Iman Heath (wife) [phone]    Additional Information about above appointments (if needed):    1: Cardiology  2: PCP  3: Neurology (pt's neurologist is outside of [facility] - pt's wife will make appt)  4: [facility]    Other comments or requests:    APPTS ARE READY TO BE MADE: [X] YES    Best Family or Patient Contact (if needed): Iman Heath (wife) [phone]    Additional Information about above appointments (if needed):    1: Cardiology  2: PCP  3: Neurology (pt's neurologist is outside of [facility] - pt's wife will make appt)  4: [facility]    Other comments or requests:     Internal Medicine:  Provider's office was contacted to secure an appointment, however the office will follow up with the patient/caregiver directly.    Cardiology/ Home Cardio:  Provided patient with provider referral information, however patient prefers to schedule the appointments on their own.  Spoke with patients wife, she will schedule    Neurology:  Patient informed us they already have secured a follow up appointment which was not scheduled by our team.   As per patients wife - Pt is scheduled for 5/29/2025       APPTS ARE READY TO BE MADE: [X] YES    Best Family or Patient Contact (if needed): Iman Heath (wife) [phone]    Additional Information about above appointments (if needed):    1: Cardiology  2: PCP  3: Neurology (pt's neurologist is outside of [facility] - pt's wife will make appt)  4: [facility]    Other comments or requests:     Internal Medicine:  Provider's office was contacted to secure an appointment, however the office will follow up with the patient/caregiver directly.    Cardiology/ Home Cardio:Appointment was scheduled by our team on the patient's behalf through the provider's office. Patient is scheduled with Dr. Lynch 5/20 3:45pm [facility]     Neurology:  Patient informed us they already have secured a follow up appointment which was not scheduled by our team.   As per patients wife - Pt is scheduled for 5/29/2025

## 2025-05-13 NOTE — DISCHARGE NOTE PROVIDER - NSDCCPTREATMENT_GEN_ALL_CORE_FT
PRINCIPAL PROCEDURE  Procedure: CT head  Findings and Treatment: FINDINGS:  Status post left hemisphericcraniotomy with postsurgical changes in the   skull/scalp. There is a craniectomy defect in the squamous segment of the   left temporal bone with mild extracranial herniation of some of the   intracranial contents. Prior right frontoparietal craniotomy with   postsurgical changes in the skull/scalp.  No acute osseous abnormality.  Diffuse mild to moderate cerebral volume loss. No hydrocephalus. There is   a large focus of hypoattenuation in the left frontoparietal lobes with   effacement of multiple sulci. No findings to suggest an acute large   vascular territory infarct. Mild hypoattenuation of the   periventricular/deep white matter most consistent with chronic   microvascular ischemic disease. 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   Moderate focus with encephalomalacia and gliosis in the left temporal   lobe. Partially empty sella. No acute intracranial hemorrhage.  Paranasal Sinuses, Mastoid Air Cells, and Orbits: Very mild mucosal   thickening of some ethmoid air cells. Visualized mastoid air cells clear.   Bilateral lens replacement.  ______________________  IMPRESSION:  1.  Findings suspicious for an underlying mass in the left frontoparietal   lobes. Further evaluation with MR brain without and with contrast is   recommended.  2.  No acute intracranial hemorrhage. No significant midline shift. No   hydrocephalus.  3.  Mild extracranial herniation of some of the intracranial contents   through the left temporal craniectomy defect.      SECONDARY PROCEDURE  Procedure: CTA chest w/w/o contrast  Findings and Treatment: FINDINGS:  AIRWAYS/LUNGS/PLEURA: Central airway secretions and mucus plugging in the   right lower lobe. Emphysema. 1.6 cm ill-defined nodular opacity right   upper lobe (6:105). 0.9 cm left lower lobe solid nodule (image 371,   series 6). Mild atelectasis with volume loss in the right middle lobe.   Mild atelectasis in the lingula. No pleural effusion.  MEDIASTINUM AND AMADO: No lymphadenopathy.  PULMONARY ANGIOGRAM: No pulmonary embolism detected.  VESSELS: Within normal limits.  HEART: Enlarged right-sided cardiac chambers. No pericardial effusion.  VISUALIZED UPPER ABDOMEN: Partially imaged atrophic kidneys.  CHEST WALL AND BONES: Degenerative changes of the spine.  IMPRESSION:  No pulmonary embolism.  Emphysema. Right upper lobe 1.6 cm ill-defined nodular opacity and left   lower lobe 0.9 cm solid nodule are indeterminate. Short-term follow-up CT   chest in 1-3 months recommended to assess for stability/clearing.    Procedure: MRI head  Findings and Treatment: IMPRESSION:  -Postsurgical changes seen bilaterally in this patient with history of   multiple prior hemorrhage evacuations.  -Areas of sulcal hyperenhancement/hypervascularity in the left frontal   and parietal lobes with sulcal effacement and surrounding FLAIR   hyperintensity. Differential includes sequelae of prior   parenchymal/subdural hemorrhages and/or underlying dural arteriovenous   fistula. Neuro-interventional consultation should be considered for   further evaluation.  -No discrete mass lesion is identified.     PRINCIPAL PROCEDURE  Procedure: CT head  Findings and Treatment: FINDINGS:  Status post left hemisphericcraniotomy with postsurgical changes in the   skull/scalp. There is a craniectomy defect in the squamous segment of the   left temporal bone with mild extracranial herniation of some of the   intracranial contents. Prior right frontoparietal craniotomy with   postsurgical changes in the skull/scalp.  No acute osseous abnormality.  Diffuse mild to moderate cerebral volume loss. No hydrocephalus. There is   a large focus of hypoattenuation in the left frontoparietal lobes with   effacement of multiple sulci. No findings to suggest an acute large   vascular territory infarct. Mild hypoattenuation of the   periventricular/deep white matter most consistent with chronic   microvascular ischemic disease. 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   Moderate focus with encephalomalacia and gliosis in the left temporal   lobe. Partially empty sella. No acute intracranial hemorrhage.  Paranasal Sinuses, Mastoid Air Cells, and Orbits: Very mild mucosal   thickening of some ethmoid air cells. Visualized mastoid air cells clear.   Bilateral lens replacement.  ______________________  IMPRESSION:  1.  Findings suspicious for an underlying mass in the left frontoparietal   lobes. Further evaluation with MR brain without and with contrast is   recommended.  2.  No acute intracranial hemorrhage. No significant midline shift. No   hydrocephalus.  3.  Mild extracranial herniation of some of the intracranial contents   through the left temporal craniectomy defect.      SECONDARY PROCEDURE  Procedure: CTA chest w/w/o contrast  Findings and Treatment: FINDINGS:  AIRWAYS/LUNGS/PLEURA: Central airway secretions and mucus plugging in the   right lower lobe. Emphysema. 1.6 cm ill-defined nodular opacity right   upper lobe (6:105). 0.9 cm left lower lobe solid nodule (image 371,   series 6). Mild atelectasis with volume loss in the right middle lobe.   Mild atelectasis in the lingula. No pleural effusion.  MEDIASTINUM AND AMADO: No lymphadenopathy.  PULMONARY ANGIOGRAM: No pulmonary embolism detected.  VESSELS: Within normal limits.  HEART: Enlarged right-sided cardiac chambers. No pericardial effusion.  VISUALIZED UPPER ABDOMEN: Partially imaged atrophic kidneys.  CHEST WALL AND BONES: Degenerative changes of the spine.  IMPRESSION:  No pulmonary embolism.  Emphysema. Right upper lobe 1.6 cm ill-defined nodular opacity and left   lower lobe 0.9 cm solid nodule are indeterminate. Short-term follow-up CT   chest in 1-3 months recommended to assess for stability/clearing.    Procedure: MRI head  Findings and Treatment: IMPRESSION:  -Postsurgical changes seen bilaterally in this patient with history of   multiple prior hemorrhage evacuations.  -Areas of sulcal hyperenhancement/hypervascularity in the left frontal   and parietal lobes with sulcal effacement and surrounding FLAIR   hyperintensity. Differential includes sequelae of prior   parenchymal/subdural hemorrhages and/or underlying dural arteriovenous   fistula. Neuro-interventional consultation should be considered for   further evaluation.  -No discrete mass lesion is identified.    Procedure: Complete transthoracic echocardiography (TTE)  Findings and Treatment: CONCLUSIONS:      1. Left ventricular cavity is normal in size. Left ventricular wall thickness is normal. Left ventricular systolic function is normal with an ejection fraction of 62 % by Lindo's method of disks. There are no regional wall motion abnormalities seen.   2. Enlarged right ventricular cavity size and reduced right ventricular systolic function. Tricuspid annular plane systolic excursion (TAPSE) is 1.3 cm (normal >=1.7 cm).   3. Structurally normal mitral valve with normal leaflet excursion. There is calcification of the mitral valve annulus. There is trace mitral regurgitation.   4. The aortic valve anatomy cannot be determined. There is calcification of the aortic valve leaflets. There is mild aortic regurgitation.   5. No prior echocardiogram is available for comparison.    Procedure: Follow-up or limited transthoracic echocardiography (TTE)  Findings and Treatment: CONCLUSIONS:      1. Limited TTE performed to assess biventricular systolic function.   2. Left ventricular cavity is small. Left ventricular systolic function is normal with an ejection fraction of 55 % by Lindo's method of disks. There are no regional wall motion abnormalities seen.   3. Severely enlarged right ventricular cavity size, with increased wall thickness, and moderately reduced right ventricular systolic function.   4. Estimated pulmonary artery systolic pressure is 55 mmHg.

## 2025-05-13 NOTE — DISCHARGE NOTE PROVIDER - NSDCCPCAREPLAN_GEN_ALL_CORE_FT
PRINCIPAL DISCHARGE DIAGNOSIS  Diagnosis: Elevated troponin  Assessment and Plan of Treatment:       SECONDARY DISCHARGE DIAGNOSES  Diagnosis: Chest pain  Assessment and Plan of Treatment:      PRINCIPAL DISCHARGE DIAGNOSIS  Diagnosis: Chest pain  Assessment and Plan of Treatment: You came to the hospital after you were found to have chest pain. Based on an ultrasound of your heart, it was suspected that you might have had a blood clot in your lungs (pulmonary embolism). However, a CT angiography of your chest did not show evidence of a pulmonary embolism, although it did show 2 nodules in your right and left lungs. Therefore, your chest pain was thought to be due to. Please follow up with the cardiologist in 1 week to. Please also follow up with your PCP/pulmonologist regarding further monitoring of the 2 lung nodules - they can arrange for you to have a follow-up CT scan of your chest in 1-3 months.  If you have a recurrence of chest pain, shortness of breath, fevers, nausea/vomiting, please visit the nearest emergency room.      SECONDARY DISCHARGE DIAGNOSES  Diagnosis: H/O spontaneous intraparenchymal intracranial hemorrhage  Assessment and Plan of Treatment: When you first arrived to the hospital, you had a CT scan of your head done given your history of prior brain bleeds. The CT scan showed suspicion for underlying brain mass. Therefore, you were seen by the Neurosurgery team who recommended an MRI scan of your head. This MRI scan did not show evidence of a brain mass but instead showed a possible connection between an artery and vein in your dura (dural AV fistula). After discussion between you and the Neurosurgery team, it was decided that there was no intervention that needed to be done at this time. Please follow up with your neurologist in 2 weeks regarding further management of your prior brain bleeds.  If you experience headache, dizziness, numbness/tingling, inability to hold your urine or bowel movements, please visit the nearest emergency room.    Diagnosis: Hypertension  Assessment and Plan of Treatment: Your blood pressures were monitored throughout your hospital stay. Your amlodipine and losartan were not continued in the hospital as your blood pressures were normal off your medications. Please follow up with your PCP regarding further need to resume your amlodipine and losartan.     PRINCIPAL DISCHARGE DIAGNOSIS  Diagnosis: Chest pain  Assessment and Plan of Treatment: You came to the hospital after you were found to have chest pain. Based on an ultrasound of your heart, it was suspected that you might have had a blood clot in your lungs (pulmonary embolism). However, a CT angiography of your chest did not show evidence of a pulmonary embolism, although it did show 2 nodules in your right and left lungs. However, you were found to have cardiac arrhythmias (NSTEMI, NSVT, pSVT) that required discussion about possible cardiac catheterization. After discussion between you, your family, and the cardiologist regarding benefits vs. risks of cardiac catheterization, you and your family decided to forgo the procedure at this time. Instead we started you on atorvastatin 40mg once a day at bedtime and metoprolol succinate 25mg once a day - please continue taking these. Please follow up with the cardiologist in 2 weeks for further monitoring of your condition. Please also follow up with your PCP/pulmonologist regarding further monitoring of the 2 lung nodules - they can arrange for you to have a follow-up CT scan of your chest in 1-3 months.  If you have a recurrence of chest pain, shortness of breath, fevers, nausea/vomiting, please visit the nearest emergency room.      SECONDARY DISCHARGE DIAGNOSES  Diagnosis: H/O spontaneous intraparenchymal intracranial hemorrhage  Assessment and Plan of Treatment: When you first arrived to the hospital, you had a CT scan of your head done given your history of prior brain bleeds. The CT scan showed suspicion for underlying brain mass. Therefore, you were seen by the Neurosurgery team who recommended an MRI scan of your head. This MRI scan did not show evidence of a brain mass but instead showed a possible connection between an artery and vein in your dura (dural AV fistula). After discussion between you and the Neurosurgery team, it was decided that there was no intervention that needed to be done at this time. Please follow up with your neurologist in 2 weeks regarding further management of your prior brain bleeds.  If you experience headache, dizziness, numbness/tingling, inability to hold your urine or bowel movements, please visit the nearest emergency room.    Diagnosis: Hypertension  Assessment and Plan of Treatment: Your blood pressures were monitored throughout your hospital stay. Your amlodipine was not continued in the hospital as your blood pressures were normal off your medication. Please follow up with your PCP regarding further need to resume your amlodipine.     PRINCIPAL DISCHARGE DIAGNOSIS  Diagnosis: Chest pain  Assessment and Plan of Treatment: You came to the hospital after you were found to have chest pain. Based on an ultrasound of your heart, it was suspected that you might have had a blood clot in your lungs (pulmonary embolism). However, a CT angiography of your chest did not show evidence of a pulmonary embolism, although it did show 2 nodules in your right and left lungs. However, you were found to have cardiac arrhythmias (NSTEMI, NSVT, pSVT) that required discussion about possible cardiac catheterization. After discussion between you, your family, and the cardiologist regarding benefits vs. risks of cardiac catheterization, you and your family decided to forgo the procedure at this time. Instead we started you on atorvastatin 40mg once a day at bedtime and metoprolol succinate 25mg once a day - please continue taking these. Please follow up with the cardiologist in 2 weeks for further monitoring of your condition. Please also follow up with your PCP/pulmonologist regarding further monitoring of the 2 lung nodules - they can arrange for you to have a follow-up CT scan of your chest in 1-3 months.  If you have a recurrence of chest pain, shortness of breath, fevers, nausea/vomiting, please visit the nearest emergency room.      SECONDARY DISCHARGE DIAGNOSES  Diagnosis: H/O spontaneous intraparenchymal intracranial hemorrhage  Assessment and Plan of Treatment: When you first arrived to the hospital, you had a CT scan of your head done given your history of prior brain bleeds. The CT scan showed suspicion for underlying brain mass. Therefore, you were seen by the Neurosurgery team who recommended an MRI scan of your head. This MRI scan did not show evidence of a brain mass but instead showed a possible connection between an artery and vein in your dura (dural AV fistula). After discussion between you and the Neurosurgery team, it was decided that there was no intervention that needed to be done at this time. Please follow up with your neurologist in 2 weeks regarding further management of your prior brain bleeds.  If you experience headache, dizziness, numbness/tingling, inability to hold your urine or bowel movements, please visit the nearest emergency room.     PRINCIPAL DISCHARGE DIAGNOSIS  Diagnosis: Chest pain  Assessment and Plan of Treatment: You came to the hospital after you were found to have chest pain. Based on an ultrasound of your heart, it was suspected that you might have had a blood clot in your lungs (pulmonary embolism). However, a CT angiography of your chest did not show evidence of a pulmonary embolism, although it did show 2 nodules in your right and left lungs. However, you were found to have cardiac arrhythmias (NSTEMI, NSVT, pSVT) that required discussion about possible cardiac catheterization. After discussion between you, your family, and the cardiologist regarding benefits vs. risks of cardiac catheterization, you and your family decided to forgo the procedure at this time. Instead we started you on atorvastatin 40mg once a day at bedtime and metoprolol succinate 25mg once a day - please continue taking these. A repeat ultrasound of your heart also showed pulmonary hypertension, which was thought to be due to your history of COPD; therefore, your losartan was increased from 25mg to 50mg once a day. Please follow up with the cardiologist in 1 week for further monitoring of your condition. Please also follow up with your PCP/pulmonologist regarding further monitoring of the 2 lung nodules - they can arrange for you to have a follow-up CT scan of your chest in 1-3 months.  If you have a recurrence of chest pain, shortness of breath, fevers, nausea/vomiting, please visit the nearest emergency room.      SECONDARY DISCHARGE DIAGNOSES  Diagnosis: H/O spontaneous intraparenchymal intracranial hemorrhage  Assessment and Plan of Treatment: When you first arrived to the hospital, you had a CT scan of your head done given your history of prior brain bleeds. The CT scan showed suspicion for underlying brain mass. Therefore, you were seen by the Neurosurgery team who recommended an MRI scan of your head. This MRI scan did not show evidence of a brain mass but instead showed a possible connection between an artery and vein in your dura (dural AV fistula). After discussion between you and the Neurosurgery team, it was decided that there was no intervention that needed to be done at this time. Please follow up with your neurologist in 2 weeks regarding further management of your prior brain bleeds.  If you experience headache, dizziness, numbness/tingling, inability to hold your urine or bowel movements, please visit the nearest emergency room.

## 2025-05-13 NOTE — DISCHARGE NOTE PROVIDER - CARE PROVIDERS DIRECT ADDRESSES
,[email or web address],DirectAddress_Unknown

## 2025-05-14 ENCOUNTER — RESULT REVIEW (OUTPATIENT)
Age: 85
End: 2025-05-14

## 2025-05-14 LAB
ANION GAP SERPL CALC-SCNC: 10 MMOL/L — SIGNIFICANT CHANGE UP (ref 5–17)
APTT BLD: 57 SEC — HIGH (ref 26.1–36.8)
APTT BLD: 67.1 SEC — HIGH (ref 26.1–36.8)
BUN SERPL-MCNC: 14 MG/DL — SIGNIFICANT CHANGE UP (ref 7–23)
CALCIUM SERPL-MCNC: 8.4 MG/DL — SIGNIFICANT CHANGE UP (ref 8.4–10.5)
CHLORIDE SERPL-SCNC: 104 MMOL/L — SIGNIFICANT CHANGE UP (ref 96–108)
CO2 SERPL-SCNC: 24 MMOL/L — SIGNIFICANT CHANGE UP (ref 22–31)
CREAT SERPL-MCNC: 0.57 MG/DL — SIGNIFICANT CHANGE UP (ref 0.5–1.3)
EGFR: 95 ML/MIN/1.73M2 — SIGNIFICANT CHANGE UP
EGFR: 95 ML/MIN/1.73M2 — SIGNIFICANT CHANGE UP
GLUCOSE SERPL-MCNC: 90 MG/DL — SIGNIFICANT CHANGE UP (ref 70–99)
HCT VFR BLD CALC: 49.8 % — SIGNIFICANT CHANGE UP (ref 39–50)
HCT VFR BLD CALC: 51.2 % — HIGH (ref 39–50)
HGB BLD-MCNC: 15.7 G/DL — SIGNIFICANT CHANGE UP (ref 13–17)
HGB BLD-MCNC: 16.3 G/DL — SIGNIFICANT CHANGE UP (ref 13–17)
MAGNESIUM SERPL-MCNC: 2.1 MG/DL — SIGNIFICANT CHANGE UP (ref 1.6–2.6)
MCHC RBC-ENTMCNC: 28.3 PG — SIGNIFICANT CHANGE UP (ref 27–34)
MCHC RBC-ENTMCNC: 28.3 PG — SIGNIFICANT CHANGE UP (ref 27–34)
MCHC RBC-ENTMCNC: 31.5 G/DL — LOW (ref 32–36)
MCHC RBC-ENTMCNC: 31.8 G/DL — LOW (ref 32–36)
MCV RBC AUTO: 88.9 FL — SIGNIFICANT CHANGE UP (ref 80–100)
MCV RBC AUTO: 89.9 FL — SIGNIFICANT CHANGE UP (ref 80–100)
NRBC BLD AUTO-RTO: 0 /100 WBCS — SIGNIFICANT CHANGE UP (ref 0–0)
NRBC BLD AUTO-RTO: 0 /100 WBCS — SIGNIFICANT CHANGE UP (ref 0–0)
PHOSPHATE SERPL-MCNC: 2.6 MG/DL — SIGNIFICANT CHANGE UP (ref 2.5–4.5)
PLATELET # BLD AUTO: 121 K/UL — LOW (ref 150–400)
PLATELET # BLD AUTO: 142 K/UL — LOW (ref 150–400)
POTASSIUM SERPL-MCNC: 3.9 MMOL/L — SIGNIFICANT CHANGE UP (ref 3.5–5.3)
POTASSIUM SERPL-SCNC: 3.9 MMOL/L — SIGNIFICANT CHANGE UP (ref 3.5–5.3)
RBC # BLD: 5.54 M/UL — SIGNIFICANT CHANGE UP (ref 4.2–5.8)
RBC # BLD: 5.76 M/UL — SIGNIFICANT CHANGE UP (ref 4.2–5.8)
RBC # FLD: 15.6 % — HIGH (ref 10.3–14.5)
RBC # FLD: 15.6 % — HIGH (ref 10.3–14.5)
SODIUM SERPL-SCNC: 138 MMOL/L — SIGNIFICANT CHANGE UP (ref 135–145)
WBC # BLD: 6.55 K/UL — SIGNIFICANT CHANGE UP (ref 3.8–10.5)
WBC # BLD: 7.92 K/UL — SIGNIFICANT CHANGE UP (ref 3.8–10.5)
WBC # FLD AUTO: 6.55 K/UL — SIGNIFICANT CHANGE UP (ref 3.8–10.5)
WBC # FLD AUTO: 7.92 K/UL — SIGNIFICANT CHANGE UP (ref 3.8–10.5)

## 2025-05-14 PROCEDURE — 99233 SBSQ HOSP IP/OBS HIGH 50: CPT

## 2025-05-14 PROCEDURE — 93321 DOPPLER ECHO F-UP/LMTD STD: CPT | Mod: 26

## 2025-05-14 PROCEDURE — 93325 DOPPLER ECHO COLOR FLOW MAPG: CPT | Mod: 26

## 2025-05-14 PROCEDURE — 93356 MYOCRD STRAIN IMG SPCKL TRCK: CPT

## 2025-05-14 PROCEDURE — 93308 TTE F-UP OR LMTD: CPT | Mod: 26

## 2025-05-14 RX ORDER — METOPROLOL SUCCINATE 50 MG/1
25 TABLET, EXTENDED RELEASE ORAL DAILY
Refills: 0 | Status: DISCONTINUED | OUTPATIENT
Start: 2025-05-14 | End: 2025-05-15

## 2025-05-14 RX ORDER — ALBUTEROL SULFATE 2.5 MG/3ML
2 VIAL, NEBULIZER (ML) INHALATION
Refills: 0 | DISCHARGE

## 2025-05-14 RX ORDER — SOD PHOS DI, MONO/K PHOS MONO 250 MG
1 TABLET ORAL ONCE
Refills: 0 | Status: COMPLETED | OUTPATIENT
Start: 2025-05-14 | End: 2025-05-14

## 2025-05-14 RX ORDER — TIOTROPIUM BROMIDE INHALATION SPRAY 3.12 UG/1
1 SPRAY, METERED RESPIRATORY (INHALATION)
Refills: 0 | DISCHARGE

## 2025-05-14 RX ORDER — ENOXAPARIN SODIUM 100 MG/ML
40 INJECTION SUBCUTANEOUS EVERY 24 HOURS
Refills: 0 | Status: DISCONTINUED | OUTPATIENT
Start: 2025-05-14 | End: 2025-05-15

## 2025-05-14 RX ORDER — LOSARTAN POTASSIUM 100 MG/1
25 TABLET, FILM COATED ORAL AT BEDTIME
Refills: 0 | Status: DISCONTINUED | OUTPATIENT
Start: 2025-05-14 | End: 2025-05-15

## 2025-05-14 RX ADMIN — Medication 1 PACKET(S): at 07:45

## 2025-05-14 RX ADMIN — TIOTROPIUM BROMIDE INHALATION SPRAY 2 PUFF(S): 3.12 SPRAY, METERED RESPIRATORY (INHALATION) at 11:46

## 2025-05-14 RX ADMIN — ATORVASTATIN CALCIUM 40 MILLIGRAM(S): 80 TABLET, FILM COATED ORAL at 21:57

## 2025-05-14 RX ADMIN — Medication 1 DOSE(S): at 04:49

## 2025-05-14 RX ADMIN — METOPROLOL SUCCINATE 25 MILLIGRAM(S): 50 TABLET, EXTENDED RELEASE ORAL at 13:13

## 2025-05-14 RX ADMIN — HEPARIN SODIUM 10.5 UNIT(S)/HR: 1000 INJECTION INTRAVENOUS; SUBCUTANEOUS at 07:10

## 2025-05-14 RX ADMIN — LEVETIRACETAM 1000 MILLIGRAM(S): 10 INJECTION, SOLUTION INTRAVENOUS at 17:21

## 2025-05-14 RX ADMIN — Medication 2 PUFF(S): at 08:34

## 2025-05-14 RX ADMIN — LEVETIRACETAM 1000 MILLIGRAM(S): 10 INJECTION, SOLUTION INTRAVENOUS at 04:49

## 2025-05-14 RX ADMIN — HEPARIN SODIUM 10.5 UNIT(S)/HR: 1000 INJECTION INTRAVENOUS; SUBCUTANEOUS at 04:48

## 2025-05-14 RX ADMIN — Medication 75 MICROGRAM(S): at 04:49

## 2025-05-14 RX ADMIN — LOSARTAN POTASSIUM 25 MILLIGRAM(S): 100 TABLET, FILM COATED ORAL at 21:56

## 2025-05-14 RX ADMIN — Medication 81 MILLIGRAM(S): at 11:45

## 2025-05-14 RX ADMIN — ENOXAPARIN SODIUM 40 MILLIGRAM(S): 100 INJECTION SUBCUTANEOUS at 19:57

## 2025-05-14 NOTE — PHYSICAL THERAPY INITIAL EVALUATION ADULT - PERTINENT HX OF CURRENT PROBLEM, REHAB EVAL
Pt is a 84 year old male with PMH significant for right SDH s/p evac. (2006) at [facility], left IPH s/p evac./hemicrani/plasty (2009) c/b expressive aphasia @[facility], COPD, hypothyroidism, HTN. Pt presents for one day of chest pain. Overnight, pt had mild intermittent L-sided chest pain radiating down his L arm that was waking him up several times throughout the night. Pt's wife also noted that over the past week, pt has been having more SOB requiring usage of his albuterol inhaler. After eating breakfast, pt's wife noticed that pt was very weak but responsive. Pt then began having worsening chest pain, so pt's wife called EMS. Pt received nitroglycerin and 325mg of ASA via EMS.  Pt admitted for CP, likely 2/2 PE vs. myopericarditis vs. MI.  CT Chest negative for PE.  MR head showing possible sequelae of prev. SDH/IPH and/or dural AV fistula.  Pt cleared for AC per neuro IR note 5/13.

## 2025-05-14 NOTE — PROVIDER CONTACT NOTE (OTHER) - ACTION/TREATMENT ORDERED:
Contacted ACP.  Range goal is 50-70. Aptt is therapeutic. Interventions: Continue rate at 10.5ml/hr
Provider aware. Care ongoing.

## 2025-05-14 NOTE — PHYSICAL THERAPY INITIAL EVALUATION ADULT - GENERAL OBSERVATIONS, REHAB EVAL
Pt rec'd seated on EOB, +NC x 3L, +telemetry, +IVL, in NAD, wife at bedside.  Pt cleared for PT session by SHIMA Manning.

## 2025-05-14 NOTE — PROGRESS NOTE ADULT - PROBLEM SELECTOR PLAN 5
Is This A New Presentation, Or A Follow-Up?: Skin Lesions How Severe Is Your Skin Lesion?: mild Have Your Skin Lesions Been Treated?: not been treated - c/w home levothyroxine 75mcg QD  - TSH wnl

## 2025-05-14 NOTE — PHYSICAL THERAPY INITIAL EVALUATION ADULT - GAIT DEVIATIONS NOTED, PT EVAL
decreased endurance, Pt SpO2 92% on 3L s/p ambulation/decreased [place]/decreased step length/decreased stride length/decreased weight-shifting ability

## 2025-05-14 NOTE — PROVIDER CONTACT NOTE (OTHER) - BACKGROUND
Pt is 84 y.o. M w/ PMH of right SDH s/p evac. (2006), left IPH s/p evac./hemicrani/plasty (2009) c/b expressive aphasia, COPD, hypothyroidism, HTN admitted for chest pain.
PMH Hypothyroidism, htn, copd

## 2025-05-14 NOTE — PROGRESS NOTE ADULT - ASSESSMENT
Pt is 84 y.o. M w/ PMH of right SDH s/p evac. (2006), left IPH s/p evac./hemicrani/plasty (2009) c/b expressive aphasia, COPD, hypothyroidism, HTN admitted for chest pain, now resolved. CTA chest (-) for PE. MR head showing possible sequelae of prev. SDH/IPH and/or dural AV fistula. Pt is 84 y.o. M w/ PMH of right SDH s/p evac. (2006), left IPH s/p evac./hemicrani/plasty (2009) c/b expressive aphasia, COPD, hypothyroidism, HTN admitted for chest pain, now resolved. CTA chest (-) for PE. MR head showing possible sequelae of prev. SDH/IPH and/or dural AV fistula - no intervention at this time. No plan for cath - will continue w/ medical management given NSTEMI, NSVT, pSVT.

## 2025-05-14 NOTE — PROGRESS NOTE ADULT - SUBJECTIVE AND OBJECTIVE BOX
Patient is a 84y old  Male who presents with a chief complaint of chest pain (13 May 2025 13:22)      SUBJECTIVE / OVERNIGHT EVENTS: Patient seen and examined at bedside. No acute events overnight.    MEDICATIONS  (STANDING):  aspirin enteric coated 81 milliGRAM(s) Oral daily  atorvastatin 40 milliGRAM(s) Oral at bedtime  fluticasone propionate/ salmeterol 100-50 MICROgram(s) Diskus 1 Dose(s) Inhalation two times a day  heparin  Infusion 1050 Unit(s)/Hr (10.5 mL/Hr) IV Continuous <Continuous>  levETIRAcetam 1000 milliGRAM(s) Oral two times a day  levothyroxine 75 MICROGram(s) Oral daily  tiotropium 2.5 MICROgram(s) Inhaler 2 Puff(s) Inhalation daily    MEDICATIONS  (PRN):  albuterol    90 MICROgram(s) HFA Inhaler 2 Puff(s) Inhalation every 6 hours PRN for shortness of breath and/or wheezing      Vital Signs Last 24 Hrs  T(C): 36.4 (14 May 2025 03:55), Max: 36.6 (13 May 2025 10:52)  T(F): 97.6 (14 May 2025 03:55), Max: 97.9 (13 May 2025 10:52)  HR: 80 (14 May 2025 03:55) (61 - 80)  BP: 127/85 (14 May 2025 03:55) (126/70 - 150/72)  BP(mean): --  RR: 18 (14 May 2025 03:55) (18 - 18)  SpO2: 94% (14 May 2025 03:55) (94% - 95%)    Parameters below as of 14 May 2025 03:55  Patient On (Oxygen Delivery Method): nasal cannula  O2 Flow (L/min): 2    CAPILLARY BLOOD GLUCOSE        I&O's Summary    13 May 2025 07:01  -  14 May 2025 07:00  --------------------------------------------------------  IN: 480 mL / OUT: 650 mL / NET: -170 mL        PHYSICAL EXAM:  GENERAL: NAD, resting comfortably in bed  HEAD:  Atraumatic, Normocephalic  EYES: EOMI, PERRLA, conjunctiva and sclera clear  NECK: Supple, No JVD  CHEST/LUNG: Clear to auscultation bilaterally; No wheeze  HEART: Regular rate and rhythm; No murmurs, rubs, or gallops  ABDOMEN: Soft, Nontender, Nondistended; Bowel sounds present  EXTREMITIES:  2+ Peripheral Pulses, No clubbing, cyanosis, or edema  PSYCH: AAOx3  NEUROLOGY: non-focal  SKIN: No rashes or lesions    LABS:                        15.7   6.55  )-----------( 121      ( 14 May 2025 06:03 )             49.8     05-14    138  |  104  |  14  ----------------------------<  90  3.9   |  24  |  0.57    Ca    8.4      14 May 2025 06:03  Phos  2.6     05-14  Mg     2.1     05-14      PTT - ( 14 May 2025 06:03 )  PTT:57.0 sec      Urinalysis Basic - ( 14 May 2025 06:03 )    Color: x / Appearance: x / SG: x / pH: x  Gluc: 90 mg/dL / Ketone: x  / Bili: x / Urobili: x   Blood: x / Protein: x / Nitrite: x   Leuk Esterase: x / RBC: x / WBC x   Sq Epi: x / Non Sq Epi: x / Bacteria: x        RADIOLOGY & ADDITIONAL TESTS:    Imaging Personally Reviewed:    Consultant(s) Notes Reviewed:      Care Discussed with Consultants/Other Providers:    Brielle Elliott MD, Internal Medicine Resident     Patient is a 84y old  Male who presents with a chief complaint of chest pain (13 May 2025 13:22)      SUBJECTIVE / OVERNIGHT EVENTS: Patient seen and examined at bedside. No acute events overnight. On tele, pt had frequents PACs, was satting 88-92% on 2-3L NC. Pt continues to deny any chest pain, cough, sneezing, endorses SOB only when walking around.     MEDICATIONS  (STANDING):  aspirin enteric coated 81 milliGRAM(s) Oral daily  atorvastatin 40 milliGRAM(s) Oral at bedtime  fluticasone propionate/ salmeterol 100-50 MICROgram(s) Diskus 1 Dose(s) Inhalation two times a day  heparin  Infusion 1050 Unit(s)/Hr (10.5 mL/Hr) IV Continuous <Continuous>  levETIRAcetam 1000 milliGRAM(s) Oral two times a day  levothyroxine 75 MICROGram(s) Oral daily  tiotropium 2.5 MICROgram(s) Inhaler 2 Puff(s) Inhalation daily    MEDICATIONS  (PRN):  albuterol    90 MICROgram(s) HFA Inhaler 2 Puff(s) Inhalation every 6 hours PRN for shortness of breath and/or wheezing      Vital Signs Last 24 Hrs  T(C): 36.4 (14 May 2025 03:55), Max: 36.6 (13 May 2025 10:52)  T(F): 97.6 (14 May 2025 03:55), Max: 97.9 (13 May 2025 10:52)  HR: 80 (14 May 2025 03:55) (61 - 80)  BP: 127/85 (14 May 2025 03:55) (126/70 - 150/72)  BP(mean): --  RR: 18 (14 May 2025 03:55) (18 - 18)  SpO2: 94% (14 May 2025 03:55) (94% - 95%)    Parameters below as of 14 May 2025 03:55  Patient On (Oxygen Delivery Method): nasal cannula  O2 Flow (L/min): 2    CAPILLARY BLOOD GLUCOSE        I&O's Summary    13 May 2025 07:01  -  14 May 2025 07:00  --------------------------------------------------------  IN: 480 mL / OUT: 650 mL / NET: -170 mL        PHYSICAL EXAM:  GENERAL: NAD, resting comfortably in bed  HEAD:  Atraumatic, Normocephalic  EYES: Conjunctiva and sclera clear  NECK: Supple, No JVD  CHEST/LUNG: Clear to auscultation but decreased breath sounds bilaterally; No wheeze  HEART: Regular rate and rhythm; No murmurs, rubs, or gallops  ABDOMEN: Soft, Nontender, Nondistended; Bowel sounds present  EXTREMITIES:  2+ Peripheral Pulses, No clubbing, cyanosis, or edema  PSYCH: AAOx2 (oriented to person, place, but not time- at baseline)  NEUROLOGY: non-focal  SKIN: No rashes or lesions    LABS:                        15.7   6.55  )-----------( 121      ( 14 May 2025 06:03 )             49.8     05-14    138  |  104  |  14  ----------------------------<  90  3.9   |  24  |  0.57    Ca    8.4      14 May 2025 06:03  Phos  2.6     05-14  Mg     2.1     05-14      PTT - ( 14 May 2025 06:03 )  PTT:57.0 sec      Urinalysis Basic - ( 14 May 2025 06:03 )    Color: x / Appearance: x / SG: x / pH: x  Gluc: 90 mg/dL / Ketone: x  / Bili: x / Urobili: x   Blood: x / Protein: x / Nitrite: x   Leuk Esterase: x / RBC: x / WBC x   Sq Epi: x / Non Sq Epi: x / Bacteria: x        RADIOLOGY & ADDITIONAL TESTS:    Imaging Personally Reviewed:    Consultant(s) Notes Reviewed:      Care Discussed with Consultants/Other Providers:    Brielle Elliott MD, Internal Medicine Resident

## 2025-05-14 NOTE — PROGRESS NOTE ADULT - PROBLEM SELECTOR PLAN 2
- H/o right SDH s/p evac. (2006) at [facility], left IPH s/p evac./hemicrani/plasty (2009) c/b expressive aphasia @[facility]  - CT head (5/11): w/o acute hemorrhage, L frontal encephalomalacia c/w known IPH, suspicion for underlying mass in L frontoparietal lobes  - MR brain (5/12): possible sequelae of prev. 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 (2006) at [facility], left IPH s/p evac./hemicrani/plasty (2009) c/b expressive aphasia @[facility]  - CT head (5/11): w/o acute hemorrhage, L frontal encephalomalacia c/w known IPH, suspicion for underlying mass in L frontoparietal lobes  - MR brain (5/12): possible sequelae of prev. 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

## 2025-05-14 NOTE — PROGRESS NOTE ADULT - PROBLEM SELECTOR PLAN 6
- DVT PPX: hep gtt  - GI PPX: N/A  - Diet: soft and bite sized - DASH  - PT eval pending  - Dispo: pending clinical course - DVT PPX: Lovenox SQ  - GI PPX: N/A  - Diet: soft and bite sized - DASH  - PT eval pending  - Dispo: possible d/c tmrw

## 2025-05-14 NOTE — PHYSICAL THERAPY INITIAL EVALUATION ADULT - ADDITIONAL COMMENTS
Pt lives with spouse in a house with 2 CESAR.  PTA pt independent with ambulation and ADLs, owns rolling walker and cane. Pt lives with spouse in a ranch style house with 2 CESAR, first floor set up.  PTA pt ambulates independently, per spouse she will give him cane or walker if he seems shaky, and mostly independent with ADLs, spouse supervises showers.  Pt owns rolling walker cane and WC.  Pt has grab bars in bathroom.  Per spouse, she takes him out to the library and Buddhism frequently.

## 2025-05-14 NOTE — PROGRESS NOTE ADULT - PROBLEM SELECTOR PLAN 3
- home meds: albuterol inhaler PRN, Spiriva 18mcg capsule 1 puff QD, Wixela 100-50 1 puff BID  - c/w home meds equivalences: Advair, Spiriva, albuterol inhaler PRN - home meds: albuterol inhaler PRN, Spiriva 18mcg capsule 1 puff QD, Wixela 100-50 1 puff BID  - c/w home meds equivalences: Advair, Spiriva, albuterol inhaler PRN  - c/w O2NC - wean as tolerated - will need home O2 upon d/c

## 2025-05-14 NOTE — PROGRESS NOTE ADULT - SUBJECTIVE AND OBJECTIVE BOX
CARDIOLOGY ATTENDING PROGRESS NOTE     S:    Denies chest pain or SOB.     O:    PE  Vital Signs Last 24 Hrs  T(C): 36.4 (14 May 2025 03:55), Max: 36.6 (13 May 2025 10:52)  T(F): 97.6 (14 May 2025 03:55), Max: 97.9 (13 May 2025 10:52)  HR: 80 (14 May 2025 03:55) (61 - 80)  BP: 127/85 (14 May 2025 03:55) (126/70 - 150/72)  BP(mean): --  RR: 18 (14 May 2025 03:55) (18 - 18)  SpO2: 94% (14 May 2025 03:55) (94% - 95%)    Parameters below as of 14 May 2025 03:55  Patient On (Oxygen Delivery Method): nasal cannula  O2 Flow (L/min): 2      Gen: NAD, breathing comfortably  HEENT: MMM, anicteric sclera  Neck: JVP < 10 cm  Cardiac: RRR  Lungs: decreased breath sounds bilaterally  Abd: soft, NT/ND. +BS  Ext: warm and well perfused. No significant LE edema    Data:  I have personally reviewed all data:  Labs reviewed    CXR  IMPRESSION:  Hazy opacity at the lung bases possibly representing atelectasis.   Otherwise clear lungs.    CTA chest  AIRWAYS/LUNGS/PLEURA: Central airway secretions and mucus plugging in the   right lower lobe. Emphysema. 1.6 cm ill-defined nodular opacity right   upper lobe (6:105). 0.9 cm left lower lobe solid nodule (image 371,   series 6). Mild atelectasis with volume loss in the right middle lobe.   Mild atelectasis in the lingula. No pleural effusion.  MEDIASTINUM AND AMADO: No lymphadenopathy.  PULMONARY ANGIOGRAM: No pulmonary embolism detected.  VESSELS: Within normal limits.  HEART: Enlarged right-sided cardiac chambers. No pericardial effusion.  VISUALIZED UPPER ABDOMEN: Partially imaged atrophic kidneys.  CHEST WALL AND BONES: Degenerative changes of the spine.  IMPRESSION:  No pulmonary embolism.  Emphysema. Right upper lobe 1.6 cm ill-defined nodular opacity and left   lower lobe 0.9 cm solid nodule are indeterminate. Short-term follow-up CT   chest in 1-3 months recommended to assess for stability/clearing.    MRI brain   IMPRESSION:  -Postsurgical changes seen bilaterally in this patient with history of   multiple prior hemorrhage evacuations.  -Areas of sulcal hyperenhancement/hypervascularity in the left frontal   and parietal lobes with sulcal effacement and surrounding FLAIR   hyperintensity. Differential includes sequelae of prior   parenchymal/subdural hemorrhages and/or underlying dural arteriovenous   fistula. Neuro-interventional consultation should be considered for   further evaluation.    -No discrete mass lesion is identified.    EK25: sinus rhythm with 1st AV block. IVCD. Possible inferior infarct  25: sinus rhythm. LAD. ST elevation consider acute inferior infarct    Echo:  personally reviewed. Enlarged RV with reduced systolic function. Free wall hypokinesis with relative preservation of the RV apex. Possible Cowart's sign  grossly low normal LVEF 50-55%. No inferior wall hypokinesis noted    Meds   MEDICATIONS  (STANDING):  aspirin enteric coated 81 milliGRAM(s) Oral daily  atorvastatin 40 milliGRAM(s) Oral at bedtime  fluticasone propionate/ salmeterol 100-50 MICROgram(s) Diskus 1 Dose(s) Inhalation two times a day  heparin  Infusion 1050 Unit(s)/Hr (10.5 mL/Hr) IV Continuous <Continuous>  levETIRAcetam 1000 milliGRAM(s) Oral two times a day  levothyroxine 75 MICROGram(s) Oral daily  tiotropium 2.5 MICROgram(s) Inhaler 2 Puff(s) Inhalation daily    Tele: sinus rhythm with PACs. Short paroxysm of SVT    A/P    CLAYTON SCOTT is a 84y Male with a history intracranial bleeding, expressive aphasia, HTN who presented with chest pain with radiation down the left arm. He received 325 mg x 1 load of ASA. TTE showed RV failure and possible Cowart's sign    #NSTEMI with concern for RV infarction, cannot exclude aborted inferior STEMI  #acute hypoxemic respiratory failure  #possible brain mass, history intracranial bleeding  #NSVT   #pSVT (short paroxysm noted on telemetry)  #1st AV block  #HTN    Evaluated by neurointerventional. Bleeding risk cannot be clinically well defined from the standpoint of the MRI brain findings and family would not want invasive neurointerventional procedures per their documentation    I had an extensive conversation with her daughter about dual antiplatelet therapy, his history of intracranial bleeding, and what coronary angiography entailed. We discussed how one would generally approach treatment of an MI and how this was complicated by his history of major bleeding. Per the daughter her and mother were okay proceeding to a coronary angiogram knowing this increased catastrophic bleeding risk were it to be deemed feasible by the cardiology team to pursue    I then discussed with interventional cardiology (Dr. Sarkar)--given the full clinical picture, history of brain bleeding and unclear delineation of whether he has excess risk of intracranial bleeding on top of what is already a baseline elevated risk, the patient has been deemed too high risk for coronary angiography with possible intervention and that medical therapy only should be pursued at this time. The risk seems to outweigh any potential benefit    Discussed this team decision with the daughter and she is okay with it (and actually seemed happy that coronary angiography would not performed)    -would stop heparin gtt as he has now s/p 48 hours of treatment  -continue ASA 81 mg daily  -no P2Y12 inhibitor therapy  -continue high intensity statin therapy  -can restart home losartan  -would trial low dose of metoprolol succinate 25 mg today given NSTEMI, NSVT and pSVT. Low dose at first because of the RV dysfunction. If tolerates then can be discharged on this    CTA chest without evidence of PE. Shows emphysema. Decreased BS noted on exam    We will continue to follow with you. Thank you for allowing us to participate in this patient's care.    Likely stable for discharge from a cardiac standpoint pending the aforementioned    Nalini Mckeon MD  [facility]  For [facility] Cardiology and Cardiovascular Surgery  service contact information, please go to [email or web address] ("[email or web address]" to login)

## 2025-05-14 NOTE — PROGRESS NOTE ADULT - PROBLEM SELECTOR PLAN 1
- EKG showing CESAR in V3-V5, TTE EF 62% w/o RWMA or change in LV function but showing RV dysfunction  - trops on admission 18 -> 27 -> 69 -> 187 -> 882 -> 1881    Plan:  - neurosx recs appreciated - no absolute contraindication to start A/C  - cards recs appreciated - ok w/ starting hep gtt (no bolus) while waiting for CTA chest for empiric coverage of PE and NSTEMI  - CTA chest (5/13) (-) for PE  - possible plan for C AFTER MRI and neurosurgery input taking into account MRI brain   - if patient shows evidence of decompensation, low threshold for ICU eval  - ASA 81mg QD, atorvastatin 40mg QHS  - neuro checks q4h  - monitor on tele  - trops trended until peak (8009 5/13) - EKG showing CESAR in V3-V5, TTE EF 62% w/o RWMA or change in LV function but showing RV dysfunction  - trops on admission 18 -> 27 -> 69 -> 187 -> 882 -> 1881    Plan:  - trops trended until peak (4118 5/13)  - CTA chest (5/13) (-) for PE  - neurosx recs appreciated - no absolute contraindication to start A/C  - cards recs appreciated - s/p hep gtt x 48 hrs, will trial metoprolol succinate 25mg QD given NSTEMI, NSVT, pSVT - low-dose b/c of RV dysfunction  - if able to tolerate metoprolol, can be dc'ed on med  - c/w ASA 81mg QD, atorvastatin 40mg QHS, no P2Y12 inhibitor therapy  - monitor on tele

## 2025-05-14 NOTE — PROGRESS NOTE ADULT - PROBLEM SELECTOR PLAN 4
- home meds: amlodipine 5mg QD, losartan 25mg QHS  - hold meds for now given normotension, can restart as needed - home meds: amlodipine 5mg QD, losartan 25mg QHS  - restarted home losartan 25mg QHS as per cards recs  - c/w holding home amlodipine given normotension

## 2025-05-14 NOTE — PROGRESS NOTE ADULT - ATTENDING COMMENTS
Patient is an 84 .year old male, with PMH of HTN, right SDH s/p evac. (2006), left IPH s/p evac./hemicrani/plasty (2009) c/b [facility] who presents for one day of chest pain. EKG- ST elevation in Inf-lateral leads, Trops 69-> 4118, cardiology evaluated.    1. Acute MI- NSTEMI vs aborted inferior STEMI  2. H/O ICH, s/p craniotomy and evacuation 2006, 2009    - No c/o chest pain, but some exertional dyspnea. hemodynamically stable, Tele- NSR 70-80s, PVCs  - repeat EKG showed ST elevation in inferolateral leads, Trops 69-> 4118   - Echo normal LV function, no WMA, will repeat Echo today  - Cardiology spoke to faimily and patient- refused [facility] and favors medical management   - will c/w ASA, statin, added Metoprol ER 25mg/d, off heparin gtt after 48hrs  - MRI brain showed- -Postsurgical changes seen bilaterally in this patient with history of multiple prior hemorrhage evacuations. Areas of sulcal hyperenhancement/hypervascularity in the left frontal and parietal lobes with sulcal effacement and surrounding FLAIR hyperintensity. Differential includes sequelae of prior parenchymal/subdural hemorrhages and/or underlying dural arteriovenous fistula. No discrete mass lesion is identified.  - Neuro IR plans noted  - Tele    Spoke to wife at bedside  spent 55 min excluding house staff teaching . Patient is an 84 .year old male, with PMH of HTN, right SDH s/p evac. (2006), left IPH s/p evac./hemicrani/plasty (2009) c/[facility] who presents for one day of chest pain. EKG- ST elevation in Inf-lateral leads, Trops 69-> 4118, cardiology evaluated.    1. Acute MI- NSTEMI vs aborted inferior STEMI  2. H/O ICH, s/p craniotomy and evacuation 2006, 2009    - No c/o chest pain, but some exertional dyspnea. hemodynamically stable, Tele- NSR 70-80s, PVCs  - repeat EKG showed ST elevation in inferolateral leads, Trops 69-> 4118   - Echo normal LV function, no WMA, will repeat Echo today  - Cardiology spoke to family and patient- refused [facility] and favors medical management   - will c/w ASA, statin, added Metoprolol ER 25mg/d, off heparin gtt after 48hrs  - MRI brain showed- -Postsurgical changes seen bilaterally in this patient with history of multiple prior hemorrhage evacuations. Areas of sulcal hyperenhancement/hypervascularity in the left frontal and parietal lobes with sulcal effacement and surrounding FLAIR hyperintensity. Differential includes sequelae of prior parenchymal/subdural hemorrhages and/or underlying dural arteriovenous fistula. No discrete mass lesion is identified.  - Neuro IR plans noted  - Tele  - GOC: full code    Spoke to wife at bedside  spent 55 min excluding house staff teaching .

## 2025-05-15 ENCOUNTER — TRANSCRIPTION ENCOUNTER (OUTPATIENT)
Age: 85
End: 2025-05-15

## 2025-05-15 VITALS
SYSTOLIC BLOOD PRESSURE: 130 MMHG | RESPIRATION RATE: 18 BRPM | TEMPERATURE: 97 F | HEART RATE: 70 BPM | OXYGEN SATURATION: 96 % | DIASTOLIC BLOOD PRESSURE: 75 MMHG

## 2025-05-15 LAB
ANION GAP SERPL CALC-SCNC: 14 MMOL/L — SIGNIFICANT CHANGE UP (ref 5–17)
BUN SERPL-MCNC: 14 MG/DL — SIGNIFICANT CHANGE UP (ref 7–23)
CALCIUM SERPL-MCNC: 8.7 MG/DL — SIGNIFICANT CHANGE UP (ref 8.4–10.5)
CHLORIDE SERPL-SCNC: 103 MMOL/L — SIGNIFICANT CHANGE UP (ref 96–108)
CO2 SERPL-SCNC: 24 MMOL/L — SIGNIFICANT CHANGE UP (ref 22–31)
CREAT SERPL-MCNC: 0.58 MG/DL — SIGNIFICANT CHANGE UP (ref 0.5–1.3)
EGFR: 95 ML/MIN/1.73M2 — SIGNIFICANT CHANGE UP
EGFR: 95 ML/MIN/1.73M2 — SIGNIFICANT CHANGE UP
GLUCOSE SERPL-MCNC: 87 MG/DL — SIGNIFICANT CHANGE UP (ref 70–99)
HCT VFR BLD CALC: 48.4 % — SIGNIFICANT CHANGE UP (ref 39–50)
HGB BLD-MCNC: 15.7 G/DL — SIGNIFICANT CHANGE UP (ref 13–17)
MAGNESIUM SERPL-MCNC: 2.1 MG/DL — SIGNIFICANT CHANGE UP (ref 1.6–2.6)
MCHC RBC-ENTMCNC: 28.6 PG — SIGNIFICANT CHANGE UP (ref 27–34)
MCHC RBC-ENTMCNC: 32.4 G/DL — SIGNIFICANT CHANGE UP (ref 32–36)
MCV RBC AUTO: 88.2 FL — SIGNIFICANT CHANGE UP (ref 80–100)
NRBC BLD AUTO-RTO: 0 /100 WBCS — SIGNIFICANT CHANGE UP (ref 0–0)
PHOSPHATE SERPL-MCNC: 2.8 MG/DL — SIGNIFICANT CHANGE UP (ref 2.5–4.5)
PLATELET # BLD AUTO: 136 K/UL — LOW (ref 150–400)
POTASSIUM SERPL-MCNC: 3.7 MMOL/L — SIGNIFICANT CHANGE UP (ref 3.5–5.3)
POTASSIUM SERPL-SCNC: 3.7 MMOL/L — SIGNIFICANT CHANGE UP (ref 3.5–5.3)
RBC # BLD: 5.49 M/UL — SIGNIFICANT CHANGE UP (ref 4.2–5.8)
RBC # FLD: 15.6 % — HIGH (ref 10.3–14.5)
SODIUM SERPL-SCNC: 141 MMOL/L — SIGNIFICANT CHANGE UP (ref 135–145)
WBC # BLD: 6.08 K/UL — SIGNIFICANT CHANGE UP (ref 3.8–10.5)
WBC # FLD AUTO: 6.08 K/UL — SIGNIFICANT CHANGE UP (ref 3.8–10.5)

## 2025-05-15 PROCEDURE — 99232 SBSQ HOSP IP/OBS MODERATE 35: CPT

## 2025-05-15 PROCEDURE — 99239 HOSP IP/OBS DSCHRG MGMT >30: CPT

## 2025-05-15 RX ORDER — ALBUTEROL SULFATE 2.5 MG/3ML
2 VIAL, NEBULIZER (ML) INHALATION
Refills: 0 | DISCHARGE

## 2025-05-15 RX ORDER — ALBUTEROL SULFATE 2.5 MG/3ML
2 VIAL, NEBULIZER (ML) INHALATION
Qty: 1 | Refills: 0
Start: 2025-05-15 | End: 2025-06-13

## 2025-05-15 RX ORDER — LEVETIRACETAM 10 MG/ML
1 INJECTION, SOLUTION INTRAVENOUS
Refills: 0 | DISCHARGE

## 2025-05-15 RX ORDER — ATORVASTATIN CALCIUM 80 MG/1
1 TABLET, FILM COATED ORAL
Qty: 30 | Refills: 0
Start: 2025-05-15 | End: 2025-06-13

## 2025-05-15 RX ORDER — ASPIRIN 325 MG
1 TABLET ORAL
Qty: 30 | Refills: 0
Start: 2025-05-15 | End: 2025-06-13

## 2025-05-15 RX ORDER — METOPROLOL SUCCINATE 50 MG/1
1 TABLET, EXTENDED RELEASE ORAL
Qty: 30 | Refills: 0
Start: 2025-05-15 | End: 2025-06-13

## 2025-05-15 RX ORDER — SOD PHOS DI, MONO/K PHOS MONO 250 MG
1 TABLET ORAL ONCE
Refills: 0 | Status: COMPLETED | OUTPATIENT
Start: 2025-05-15 | End: 2025-05-15

## 2025-05-15 RX ORDER — LEVOTHYROXINE SODIUM 300 MCG
1 TABLET ORAL
Qty: 30 | Refills: 0
Start: 2025-05-15 | End: 2025-06-13

## 2025-05-15 RX ORDER — LEVOTHYROXINE SODIUM 300 MCG
1 TABLET ORAL
Refills: 0 | DISCHARGE

## 2025-05-15 RX ORDER — LOSARTAN POTASSIUM 100 MG/1
1 TABLET, FILM COATED ORAL
Qty: 30 | Refills: 0
Start: 2025-05-15 | End: 2025-06-13

## 2025-05-15 RX ORDER — AMLODIPINE BESYLATE 10 MG/1
1 TABLET ORAL
Refills: 0 | DISCHARGE

## 2025-05-15 RX ORDER — LOSARTAN POTASSIUM 100 MG/1
1 TABLET, FILM COATED ORAL
Refills: 0 | DISCHARGE

## 2025-05-15 RX ORDER — LOSARTAN POTASSIUM 100 MG/1
50 TABLET, FILM COATED ORAL DAILY
Refills: 0 | Status: DISCONTINUED | OUTPATIENT
Start: 2025-05-15 | End: 2025-05-15

## 2025-05-15 RX ORDER — AMLODIPINE BESYLATE 10 MG/1
5 TABLET ORAL DAILY
Refills: 0 | Status: DISCONTINUED | OUTPATIENT
Start: 2025-05-15 | End: 2025-05-15

## 2025-05-15 RX ORDER — LEVETIRACETAM 10 MG/ML
1 INJECTION, SOLUTION INTRAVENOUS
Qty: 60 | Refills: 0
Start: 2025-05-15 | End: 2025-06-13

## 2025-05-15 RX ORDER — AMLODIPINE BESYLATE 10 MG/1
1 TABLET ORAL
Qty: 30 | Refills: 0
Start: 2025-05-15 | End: 2025-06-13

## 2025-05-15 RX ADMIN — Medication 81 MILLIGRAM(S): at 11:17

## 2025-05-15 RX ADMIN — TIOTROPIUM BROMIDE INHALATION SPRAY 2 PUFF(S): 3.12 SPRAY, METERED RESPIRATORY (INHALATION) at 11:18

## 2025-05-15 RX ADMIN — Medication 1 DOSE(S): at 05:39

## 2025-05-15 RX ADMIN — Medication 1 DOSE(S): at 17:27

## 2025-05-15 RX ADMIN — Medication 40 MILLIEQUIVALENT(S): at 08:02

## 2025-05-15 RX ADMIN — LEVETIRACETAM 1000 MILLIGRAM(S): 10 INJECTION, SOLUTION INTRAVENOUS at 17:27

## 2025-05-15 RX ADMIN — METOPROLOL SUCCINATE 25 MILLIGRAM(S): 50 TABLET, EXTENDED RELEASE ORAL at 05:38

## 2025-05-15 RX ADMIN — LEVETIRACETAM 1000 MILLIGRAM(S): 10 INJECTION, SOLUTION INTRAVENOUS at 05:38

## 2025-05-15 RX ADMIN — Medication 75 MICROGRAM(S): at 05:38

## 2025-05-15 RX ADMIN — AMLODIPINE BESYLATE 5 MILLIGRAM(S): 10 TABLET ORAL at 09:51

## 2025-05-15 RX ADMIN — Medication 1 PACKET(S): at 08:02

## 2025-05-15 NOTE — PROGRESS NOTE ADULT - PROBLEM SELECTOR PLAN 2
- H/o right SDH s/p evac. (2006) at [facility], left IPH s/p evac./hemicrani/plasty (2009) c/b expressive aphasia @[facility]  - CT head (5/11): w/o acute hemorrhage, L frontal encephalomalacia c/w known IPH, suspicion for underlying mass in L frontoparietal lobes  - MR brain (5/12): possible sequelae of prev. 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

## 2025-05-15 NOTE — PROGRESS NOTE ADULT - ASSESSMENT
Pt is 84 y.o. M w/ PMH of right SDH s/p evac. (2006), left IPH s/p evac./hemicrani/plasty (2009) c/b expressive aphasia, COPD, hypothyroidism, HTN admitted for chest pain, now resolved. CTA chest (-) for PE. MR head showing possible sequelae of prev. SDH/IPH and/or dural AV fistula - no intervention at this time. No plan for cath - will continue w/ medical management given NSTEMI, NSVT, pSVT.

## 2025-05-15 NOTE — PROGRESS NOTE ADULT - PROBLEM SELECTOR PLAN 1
- EKG showing CESAR in V3-V5, TTE EF 62% w/o RWMA or change in LV function but showing RV dysfunction  - trops on admission 18 -> 27 -> 69 -> 187 -> 882 -> 1881    Plan:  - trops trended until peak (4118 5/13)  - CTA chest (5/13) (-) for PE  - neurosx recs appreciated - no absolute contraindication to start A/C  - cards recs appreciated - s/p hep gtt x 48 hrs, will trial metoprolol succinate 25mg QD given NSTEMI, NSVT, pSVT - low-dose b/c of RV dysfunction  - if able to tolerate metoprolol, can be dc'ed on med  - c/w ASA 81mg QD, atorvastatin 40mg QHS, no P2Y12 inhibitor therapy  - monitor on tele - EKG showing CESAR in V3-V5, TTE (5/11): EF 62% w/o RWMA or change in LV function but showing RV dysfunction  - trops on admission 18 -> 27 -> 69 -> 187 -> 882 -> 1881    Plan:  - trops trended until peak (4118 5/13)  - CTA chest (5/13) (-) for PE  - neurosx recs appreciated - no absolute contraindication to start A/C  - cards recs appreciated - s/p hep gtt x 48 hrs, pt tolerating metoprolol succinate 25mg QD given NSTEMI, NSVT, pSVT - low-dose b/c of RV dysfunction - will c/w med upon d/c  - c/w ASA 81mg QD, atorvastatin 40mg QHS, no P2Y12 inhibitor therapy  - repeat TTE (5/14) showing EF 62 -> 55%, no RWMA but severely enlarged RV cavity size w/ increased wall thickness, mod. reduced RV SF, LV cavity small, PASP 55mmHg  - given pHTN seen on repeat TTE, restarted home amlodipine 5mg QD  - monitor on tele - EKG showing CESAR in V3-V5, TTE (5/11): EF 62% w/o RWMA or change in LV function but showing RV dysfunction  - trops on admission 18 -> 27 -> 69 -> 187 -> 882 -> 1881    Plan:  - trops trended until peak (4118 5/13)  - CTA chest (5/13) (-) for PE  - neurosx recs appreciated - no absolute contraindication to start A/C  - cards recs appreciated - s/p hep gtt x 48 hrs, pt tolerating metoprolol succinate 25mg QD given NSTEMI, NSVT, pSVT - low-dose b/c of RV dysfunction - will c/w med upon d/c  - c/w ASA 81mg QD, atorvastatin 40mg QHS, no P2Y12 inhibitor therapy  - repeat TTE (5/14) showing EF 62 -> 55%, no RWMA but severely enlarged RV cavity size w/ increased wall thickness, mod. reduced RV SF, LV cavity small, PASP 55mmHg  - given pHTN seen on repeat TTE, restarted home amlodipine 5mg QD, will f/u w/ cards if pt should be started on diuretic  - monitor on tele

## 2025-05-15 NOTE — DISCHARGE NOTE NURSING/CASE MANAGEMENT/SOCIAL WORK - PATIENT PORTAL LINK FT
You can access the FollowMyHealth Patient Portal offered by [facility] by registering at the following website: [email or web address]. By joining [facility]’s FollowMyHealth portal, you will also be able to view your health information using other applications (apps) compatible with our system.

## 2025-05-15 NOTE — PROGRESS NOTE ADULT - SUBJECTIVE AND OBJECTIVE BOX
CARDIOLOGY ATTENDING PROGRESS NOTE     S:    Denies chest pain or SOB.     O:    PE  Vital Signs Last 24 Hrs  T(C): 36.6 (15 May 2025 08:22), Max: 36.7 (14 May 2025 21:20)  T(F): 97.9 (15 May 2025 08:22), Max: 98.1 (14 May 2025 21:20)  HR: 69 (15 May 2025 08:22) (69 - 77)  BP: 161/77 (15 May 2025 08:22) (148/70 - 161/77)  BP(mean): --  RR: 18 (15 May 2025 08:22) (18 - 18)  SpO2: 97% (15 May 2025 08:22) (83% - 97%)    Parameters below as of 15 May 2025 08:22  Patient On (Oxygen Delivery Method): nasal cannula  O2 Flow (L/min): 3    Gen: NAD, breathing comfortably  HEENT: MMM, anicteric sclera  Neck: JVP < 10 cm  Cardiac: RRR  Lungs: decreased breath sounds bilaterally  Abd: soft, NT/ND. +BS  Ext: warm and well perfused. Trace LE edema    Data:  I have personally reviewed all data:  Labs reviewed    CXR  IMPRESSION:  Hazy opacity at the lung bases possibly representing atelectasis.   Otherwise clear lungs.    CTA chest  AIRWAYS/LUNGS/PLEURA: Central airway secretions and mucus plugging in the   right lower lobe. Emphysema. 1.6 cm ill-defined nodular opacity right   upper lobe (6:105). 0.9 cm left lower lobe solid nodule (image 371,   series 6). Mild atelectasis with volume loss in the right middle lobe.   Mild atelectasis in the lingula. No pleural effusion.  MEDIASTINUM AND AMADO: No lymphadenopathy.  PULMONARY ANGIOGRAM: No pulmonary embolism detected.  VESSELS: Within normal limits.  HEART: Enlarged right-sided cardiac chambers. No pericardial effusion.  VISUALIZED UPPER ABDOMEN: Partially imaged atrophic kidneys.  CHEST WALL AND BONES: Degenerative changes of the spine.  IMPRESSION:  No pulmonary embolism.  Emphysema. Right upper lobe 1.6 cm ill-defined nodular opacity and left   lower lobe 0.9 cm solid nodule are indeterminate. Short-term follow-up CT   chest in 1-3 months recommended to assess for stability/clearing.    MRI brain   IMPRESSION:  -Postsurgical changes seen bilaterally in this patient with history of   multiple prior hemorrhage evacuations.  -Areas of sulcal hyperenhancement/hypervascularity in the left frontal   and parietal lobes with sulcal effacement and surrounding FLAIR   hyperintensity. Differential includes sequelae of prior   parenchymal/subdural hemorrhages and/or underlying dural arteriovenous   fistula. Neuro-interventional consultation should be considered for   further evaluation.    -No discrete mass lesion is identified.    EK25: sinus rhythm with 1st AV block. IVCD. Possible inferior infarct  25: sinus rhythm. LAD. ST elevation consider acute inferior infarct    Echo:  personally reviewed. Enlarged RV with reduced systolic function. Free wall hypokinesis with relative preservation of the RV apex. Possible Cowart's sign  grossly low normal LVEF 50-55%. No inferior wall hypokinesis noted    Meds   MEDICATIONS  (STANDING):  amLODIPine   Tablet 5 milliGRAM(s) Oral daily  aspirin enteric coated 81 milliGRAM(s) Oral daily  atorvastatin 40 milliGRAM(s) Oral at bedtime  enoxaparin Injectable 40 milliGRAM(s) SubCutaneous every 24 hours  fluticasone propionate/ salmeterol 100-50 MICROgram(s) Diskus 1 Dose(s) Inhalation two times a day  levETIRAcetam 1000 milliGRAM(s) Oral two times a day  levothyroxine 75 MICROGram(s) Oral daily  losartan 25 milliGRAM(s) Oral at bedtime  metoprolol succinate ER 25 milliGRAM(s) Oral daily  tiotropium 2.5 MICROgram(s) Inhaler 2 Puff(s) Inhalation daily    MEDICATIONS  (PRN):  albuterol    90 MICROgram(s) HFA Inhaler 2 Puff(s) Inhalation every 6 hours PRN for shortness of breath and/or wheezing      Tele: sinus rhythm with PACs. Short paroxysm of SVT    A/P    CLAYTON SCOTT is a 84y Male with a history intracranial bleeding, expressive aphasia, emphysema on intermittent home 02, HTN who presented with chest pain with radiation down the left arm. He received 325 mg x 1 load of ASA. TTE showed RV failure and possible Cowart's sign    #NSTEMI with concern for RV infarction, cannot exclude aborted inferior STEMI  #emphysema with intermittent home O2  #history intracranial bleeding  #NSVT   #pSVT (short paroxysm noted on telemetry)  #1st AV block  #HTN    Evaluated by neurointerventional. Bleeding risk cannot be clinically well defined from the standpoint of the MRI brain findings and family would not want invasive neurointerventional procedures per their documentation. I had an extensive conversation with her daughter about dual antiplatelet therapy, his history of intracranial bleeding, and what coronary angiography entailed. We discussed how one would generally approach treatment of an MI and how this was complicated by his history of major bleeding. Per the daughter her and mother were okay proceeding to a coronary angiogram knowing this increased catastrophic bleeding risk were it to be deemed feasible by the cardiology team to pursue. I then discussed with interventional cardiology (Dr. Sarkar)--given the full clinical picture, history of brain bleeding and unclear delineation of whether he has excess risk of intracranial bleeding on top of what is already a baseline elevated risk, the patient has been deemed too high risk for coronary angiography with possible intervention and that medical therapy only should be pursued at this time. The risk seems to outweigh any potential benefit. Discussed this team decision with the daughter and she is okay with it (and actually seemed happy that coronary angiography would not performed)    Discussed today with the patient's wife at bedside. She is amenable and understanding of medical management approach to the NSTEMI  CTA chest without evidence of PE. Shows emphysema. Decreased BS noted on exam. Wife reports today he uses home O2 intermittently    -continue ASA 81 mg daily  -no P2Y12 inhibitor therapy  -continue high intensity statin therapy    Hypertensive  -home amlodipine 5 restarted today  -increase losartan to 50 mg daily  -continue metoprolol succinate 25 mg daily  I do not feel a loop diuretic in necessary at this time    The patient can follow-up with me as outpatient if this is convenient for them. Card given. Advised cardiology follow-up within 3 weeks. Should have close follow-up with PCP    Nalini Mckeon MD, [facility]  Cardiology at [place]  [address] [place] [address], [address]  [place] [address]  [phone]    The patient appears stable for discharge from a cardiac standpoint    Thank you for allowing us to participate in this patient's care. Please do not hesitate to reach out to our service for any further questions.

## 2025-05-15 NOTE — PROGRESS NOTE ADULT - SUBJECTIVE AND OBJECTIVE BOX
Patient is a 84y old  Male who presents with a chief complaint of chest pain (14 May 2025 10:34)      SUBJECTIVE / OVERNIGHT EVENTS: Patient seen and examined at bedside. No acute events overnight.    MEDICATIONS  (STANDING):  aspirin enteric coated 81 milliGRAM(s) Oral daily  atorvastatin 40 milliGRAM(s) Oral at bedtime  enoxaparin Injectable 40 milliGRAM(s) SubCutaneous every 24 hours  fluticasone propionate/ salmeterol 100-50 MICROgram(s) Diskus 1 Dose(s) Inhalation two times a day  levETIRAcetam 1000 milliGRAM(s) Oral two times a day  levothyroxine 75 MICROGram(s) Oral daily  losartan 25 milliGRAM(s) Oral at bedtime  metoprolol succinate ER 25 milliGRAM(s) Oral daily  tiotropium 2.5 MICROgram(s) Inhaler 2 Puff(s) Inhalation daily    MEDICATIONS  (PRN):  albuterol    90 MICROgram(s) HFA Inhaler 2 Puff(s) Inhalation every 6 hours PRN for shortness of breath and/or wheezing      Vital Signs Last 24 Hrs  T(C): 36.6 (15 May 2025 05:00), Max: 36.7 (14 May 2025 21:20)  T(F): 97.8 (15 May 2025 05:00), Max: 98.1 (14 May 2025 21:20)  HR: 75 (15 May 2025 05:00) (73 - 77)  BP: 148/70 (15 May 2025 05:00) (133/75 - 156/76)  BP(mean): --  RR: 18 (15 May 2025 05:00) (18 - 18)  SpO2: 96% (15 May 2025 05:00) (83% - 97%)    Parameters below as of 15 May 2025 05:00  Patient On (Oxygen Delivery Method): nasal cannula  O2 Flow (L/min): 3    CAPILLARY BLOOD GLUCOSE        I&O's Summary    14 May 2025 07:01  -  15 May 2025 07:00  --------------------------------------------------------  IN: 200 mL / OUT: 0 mL / NET: 200 mL        PHYSICAL EXAM:  GENERAL: NAD, resting comfortably in bed  HEAD:  Atraumatic, Normocephalic  EYES: EOMI, PERRLA, conjunctiva and sclera clear  NECK: Supple, No JVD  CHEST/LUNG: Clear to auscultation bilaterally; No wheeze  HEART: Regular rate and rhythm; No murmurs, rubs, or gallops  ABDOMEN: Soft, Nontender, Nondistended; Bowel sounds present  EXTREMITIES:  2+ Peripheral Pulses, No clubbing, cyanosis, or edema  PSYCH: AAOx3  NEUROLOGY: non-focal  SKIN: No rashes or lesions    LABS:                        15.7   6.08  )-----------( 136      ( 15 May 2025 06:24 )             48.4     05-15    141  |  103  |  14  ----------------------------<  87  3.7   |  24  |  0.58    Ca    8.7      15 May 2025 06:20  Phos  2.8     05-15  Mg     2.1     05-15      PTT - ( 14 May 2025 06:03 )  PTT:57.0 sec      Urinalysis Basic - ( 15 May 2025 06:20 )    Color: x / Appearance: x / SG: x / pH: x  Gluc: 87 mg/dL / Ketone: x  / Bili: x / Urobili: x   Blood: x / Protein: x / Nitrite: x   Leuk Esterase: x / RBC: x / WBC x   Sq Epi: x / Non Sq Epi: x / Bacteria: x        RADIOLOGY & ADDITIONAL TESTS:    Imaging Personally Reviewed:    Consultant(s) Notes Reviewed:      Care Discussed with Consultants/Other Providers:    Brielle Elliott MD, Internal Medicine Resident     Patient is a 84y old  Male who presents with a chief complaint of chest pain (14 May 2025 10:34)      SUBJECTIVE / OVERNIGHT EVENTS: Patient seen and examined at bedside. No acute events overnight. NSR on tele. Pt continues to deny any chest pain, has SOB only when exertion but no issues w/ walking or eating.    MEDICATIONS  (STANDING):  aspirin enteric coated 81 milliGRAM(s) Oral daily  atorvastatin 40 milliGRAM(s) Oral at bedtime  enoxaparin Injectable 40 milliGRAM(s) SubCutaneous every 24 hours  fluticasone propionate/ salmeterol 100-50 MICROgram(s) Diskus 1 Dose(s) Inhalation two times a day  levETIRAcetam 1000 milliGRAM(s) Oral two times a day  levothyroxine 75 MICROGram(s) Oral daily  losartan 25 milliGRAM(s) Oral at bedtime  metoprolol succinate ER 25 milliGRAM(s) Oral daily  tiotropium 2.5 MICROgram(s) Inhaler 2 Puff(s) Inhalation daily    MEDICATIONS  (PRN):  albuterol    90 MICROgram(s) HFA Inhaler 2 Puff(s) Inhalation every 6 hours PRN for shortness of breath and/or wheezing      Vital Signs Last 24 Hrs  T(C): 36.6 (15 May 2025 05:00), Max: 36.7 (14 May 2025 21:20)  T(F): 97.8 (15 May 2025 05:00), Max: 98.1 (14 May 2025 21:20)  HR: 75 (15 May 2025 05:00) (73 - 77)  BP: 148/70 (15 May 2025 05:00) (133/75 - 156/76)  BP(mean): --  RR: 18 (15 May 2025 05:00) (18 - 18)  SpO2: 96% (15 May 2025 05:00) (83% - 97%)    Parameters below as of 15 May 2025 05:00  Patient On (Oxygen Delivery Method): nasal cannula  O2 Flow (L/min): 3    CAPILLARY BLOOD GLUCOSE        I&O's Summary    14 May 2025 07:01  -  15 May 2025 07:00  --------------------------------------------------------  IN: 200 mL / OUT: 0 mL / NET: 200 mL        PHYSICAL EXAM:  GENERAL: NAD, resting comfortably in bed  HEAD:  Atraumatic, Normocephalic  EYES: Conjunctiva and sclera clear  NECK: Supple, No JVD  CHEST/LUNG: Clear to auscultation bilaterally; No wheeze  HEART: Regular rate and rhythm; No murmurs, rubs, or gallops  ABDOMEN: Soft, Nontender, Nondistended; Bowel sounds present  EXTREMITIES: +1/2 pitting edema in b/l LEs; 2+ Peripheral Pulses, No clubbing, cyanosis  PSYCH: AAOx2 (oriented to person, place, but not year - at baseline)  NEUROLOGY: non-focal  SKIN: No rashes or lesions    LABS:                        15.7   6.08  )-----------( 136      ( 15 May 2025 06:24 )             48.4     05-15    141  |  103  |  14  ----------------------------<  87  3.7   |  24  |  0.58    Ca    8.7      15 May 2025 06:20  Phos  2.8     05-15  Mg     2.1     05-15      PTT - ( 14 May 2025 06:03 )  PTT:57.0 sec      Urinalysis Basic - ( 15 May 2025 06:20 )    Color: x / Appearance: x / SG: x / pH: x  Gluc: 87 mg/dL / Ketone: x  / Bili: x / Urobili: x   Blood: x / Protein: x / Nitrite: x   Leuk Esterase: x / RBC: x / WBC x   Sq Epi: x / Non Sq Epi: x / Bacteria: x        RADIOLOGY & ADDITIONAL TESTS:    Imaging Personally Reviewed:    Consultant(s) Notes Reviewed: Cardiology    Care Discussed with Consultants/Other Providers:    Brielle Elliott MD, Internal Medicine Resident

## 2025-05-15 NOTE — PROGRESS NOTE ADULT - PROBLEM SELECTOR PLAN 4
- home meds: amlodipine 5mg QD, losartan 25mg QHS  - restarted home losartan 25mg QHS as per cards recs  - c/w holding home amlodipine given normotension - home meds: amlodipine 5mg QD, losartan 25mg QHS  - c/w home losartan 25mg QHS  - restarted home amlodipine 5mg QD given pHTN seen on repeat TTE (PASP 55mmHg)

## 2025-05-15 NOTE — PROGRESS NOTE ADULT - PROBLEM SELECTOR PROBLEM 2
H/O spontaneous intraparenchymal intracranial hemorrhage

## 2025-05-15 NOTE — PROGRESS NOTE ADULT - ATTENDING COMMENTS
Patient is an 84 .year old male, with PMH of HTN, right SDH s/p evac. (2006), left IPH s/p evac./hemicrani/plasty (2009) c/b expressive aphasia @[facility] who presents for one day of chest pain. EKG- ST elevation in Inf-lateral leads, Trops 69-> 4118, cardiology evaluated.    1. Acute MI- NSTEMI vs aborted inferior STEMI  2. H/O ICH, s/p craniotomy and evacuation 2006, 2009  3. HTN  4. Emphysema, on inhalers    - Afebrile, wants to go home today  - No c/o chest pain, but some exertional dyspnea. hemodynamically stable, Tele- NSR 70-80s, PVCs  - Reviewed repeat Echo- EF 55%, no WMA RV enlarges with pHTN 55  - Trops 69-> 4118, now down trending  - spoke to Cardiology attending- no need of diuretic now, increased Losartan 50mg/d and c/w Amlodipine 5mg/d and Metoprolol ER 25mg/d, may need HCTZ as outpatient  - spoke to family ( wife and daughter) and patient- refused [facility] and favors medical management   - will c/w ASA, statin, Metoprolol ER 50mg/d, Amlodipine 5mg and Losartan 50mg/d  - MRI brain showed- -Postsurgical changes seen bilaterally in this patient with history of multiple prior hemorrhage evacuations. Areas of sulcal hyperenhancement/hypervascularity in the left frontal and parietal lobes with sulcal effacement and surrounding FLAIR hyperintensity. Differential includes sequelae of prior parenchymal/subdural hemorrhages and/or underlying dural arteriovenous fistula. No discrete mass lesion is identified.  - Neuro IR plans noted  - GOC: full code  - d/c planning home in progress    Spoke to wife at bedside  spent 45 min excluding house staff teaching . Patient is an 84 .year old male, with PMH of HTN, right SDH s/p evac. (2006), left IPH s/p evac./hemicrani/plasty (2009) c/b expressive aphasia @[facility] who presents for one day of chest pain. EKG- ST elevation in Inf-lateral leads, Trops 69-> 4118, cardiology evaluated.    1. Acute MI- NSTEMI vs aborted inferior STEMI  2. H/O ICH, s/p craniotomy and evacuation 2006, 2009  3. HTN  4. Emphysema, on inhalers    - Afebrile, wants to go home today  - No c/o chest pain, but some exertional dyspnea. hemodynamically stable, Tele- NSR 70-80s, PVCs  - Reviewed repeat Echo- EF 55%, no WMA RV enlarges with pHTN 55  - Trops 69-> 4118, now down trending  - spoke to Cardiology attending- no need of diuretic now, increased Losartan 50mg/d and c/w Amlodipine 5mg/d and Metoprolol ER 25mg/d, may need HCTZ as outpatient  - spoke to family ( wife and daughter) and patient- refused [facility] and favors medical management   - will c/w ASA, statin, Metoprolol ER 50mg/d, Amlodipine 5mg and Losartan 50mg/d  - MRI brain showed- -Postsurgical changes seen bilaterally in this patient with history of multiple prior hemorrhage evacuations. Areas of sulcal hyperenhancement/hypervascularity in the left frontal and parietal lobes with sulcal effacement and surrounding FLAIR hyperintensity. Differential includes sequelae of prior parenchymal/subdural hemorrhages and/or underlying dural arteriovenous fistula. No discrete mass lesion is identified.  - Neuro IR plans noted  - GOC: DNR/DNI/MOLST  - d/c planning home in progress for today on O2    Spoke to wife at bedside  spent 45 min excluding house staff teaching .

## 2025-05-15 NOTE — PROGRESS NOTE ADULT - PROBLEM SELECTOR PLAN 3
- home meds: albuterol inhaler PRN, Spiriva 18mcg capsule 1 puff QD, Wixela 100-50 1 puff BID  - c/w home meds equivalences: Advair, Spiriva, albuterol inhaler PRN  - c/w O2NC - wean as tolerated - will need home O2 upon d/c

## 2025-05-15 NOTE — CHART NOTE - NSCHARTNOTEFT_GEN_A_CORE
Had GOC discussion w/ pt's wife who is also HCP. Confirmed w/ pt's wife/HCP that pt is DNR/DNI. Pt has prior documentation filled out at [facility] 3 years ago about pt being DNR/DNI. New MOLST form filled out and placed in chart.

## 2025-05-15 NOTE — PROGRESS NOTE ADULT - PROBLEM SELECTOR PLAN 6
- DVT PPX: Lovenox SQ  - GI PPX: N/A  - Diet: soft and bite sized - DASH  - PT eval pending  - Dispo: possible d/c tmrw - DVT PPX: Lovenox SQ  - GI PPX: N/A  - Diet: soft and bite sized - DASH  - PT recommending home PT  - Dispo: d/c today

## 2025-05-15 NOTE — DISCHARGE NOTE NURSING/CASE MANAGEMENT/SOCIAL WORK - NSDCFUADDAPPT_GEN_ALL_CORE_FT
APPTS ARE READY TO BE MADE: [X] YES    Best Family or Patient Contact (if needed): Iman Heath (wife) [phone]    Additional Information about above appointments (if needed):    1: Cardiology  2: PCP  3: Neurology (pt's neurologist is outside of [facility] - pt's wife will make appt)    Other comments or requests:

## 2025-05-15 NOTE — DISCHARGE NOTE NURSING/CASE MANAGEMENT/SOCIAL WORK - NSDCPEFALRISK_GEN_ALL_CORE
For information on Fall & Injury Prevention, visit: [email or web address] OR  [email or web address] OR  https://www.cdc.gov/steadi/patient.html

## 2025-05-16 PROBLEM — I10 ESSENTIAL (PRIMARY) HYPERTENSION: Chronic | Status: ACTIVE | Noted: 2025-05-11

## 2025-05-16 PROBLEM — E03.9 HYPOTHYROIDISM, UNSPECIFIED: Chronic | Status: ACTIVE | Noted: 2025-05-11

## 2025-05-20 ENCOUNTER — APPOINTMENT (OUTPATIENT)
Dept: CARDIOLOGY | Facility: CLINIC | Age: 85
End: 2025-05-20

## 2025-05-21 ENCOUNTER — APPOINTMENT (OUTPATIENT)
Dept: INTERNAL MEDICINE | Facility: CLINIC | Age: 85
End: 2025-05-21
Payer: MEDICARE

## 2025-05-21 ENCOUNTER — NON-APPOINTMENT (OUTPATIENT)
Age: 85
End: 2025-05-21

## 2025-05-21 VITALS
TEMPERATURE: 97.5 F | HEART RATE: 76 BPM | BODY MASS INDEX: 25.46 KG/M2 | OXYGEN SATURATION: 84 % | HEIGHT: 68 IN | WEIGHT: 168 LBS | RESPIRATION RATE: 16 BRPM

## 2025-05-21 DIAGNOSIS — J44.9 CHRONIC OBSTRUCTIVE PULMONARY DISEASE, UNSPECIFIED: ICD-10-CM

## 2025-05-21 DIAGNOSIS — Z09 ENCOUNTER FOR FOLLOW-UP EXAMINATION AFTER COMPLETED TREATMENT FOR CONDITIONS OTHER THAN MALIGNANT NEOPLASM: ICD-10-CM

## 2025-05-21 DIAGNOSIS — E03.9 HYPOTHYROIDISM, UNSPECIFIED: ICD-10-CM

## 2025-05-21 PROCEDURE — 99496 TRANSJ CARE MGMT HIGH F2F 7D: CPT

## 2025-05-27 ENCOUNTER — APPOINTMENT (OUTPATIENT)
Dept: CARDIOLOGY | Facility: CLINIC | Age: 85
End: 2025-05-27
Payer: MEDICARE

## 2025-05-27 VITALS
HEIGHT: 68 IN | TEMPERATURE: 95 F | BODY MASS INDEX: 25.01 KG/M2 | SYSTOLIC BLOOD PRESSURE: 131 MMHG | DIASTOLIC BLOOD PRESSURE: 60 MMHG | WEIGHT: 165 LBS | RESPIRATION RATE: 16 BRPM | OXYGEN SATURATION: 84 % | HEART RATE: 56 BPM

## 2025-05-27 DIAGNOSIS — I27.20 PULMONARY HYPERTENSION, UNSPECIFIED: ICD-10-CM

## 2025-05-27 DIAGNOSIS — R73.03 PREDIABETES.: ICD-10-CM

## 2025-05-27 DIAGNOSIS — I51.9 HEART DISEASE, UNSPECIFIED: ICD-10-CM

## 2025-05-27 DIAGNOSIS — E78.5 HYPERLIPIDEMIA, UNSPECIFIED: ICD-10-CM

## 2025-05-27 DIAGNOSIS — I25.10 ATHEROSCLEROTIC HEART DISEASE OF NATIVE CORONARY ARTERY W/OUT ANGINA PECTORIS: ICD-10-CM

## 2025-05-27 DIAGNOSIS — I10 ESSENTIAL (PRIMARY) HYPERTENSION: ICD-10-CM

## 2025-05-27 PROCEDURE — G2211 COMPLEX E/M VISIT ADD ON: CPT

## 2025-05-27 PROCEDURE — 99214 OFFICE O/P EST MOD 30 MIN: CPT

## 2025-05-27 RX ORDER — ROSUVASTATIN CALCIUM 5 MG/1
5 TABLET, FILM COATED ORAL
Qty: 90 | Refills: 3 | Status: ACTIVE | COMMUNITY
Start: 2025-05-27 | End: 1900-01-01

## 2025-05-27 RX ORDER — ALBUTEROL SULFATE 90 UG/1
108 (90 BASE) INHALANT RESPIRATORY (INHALATION)
Refills: 0 | Status: ACTIVE | COMMUNITY
Start: 2025-05-27

## 2025-06-02 PROCEDURE — 93321 DOPPLER ECHO F-UP/LMTD STD: CPT

## 2025-06-02 PROCEDURE — 85610 PROTHROMBIN TIME: CPT

## 2025-06-02 PROCEDURE — C8929: CPT

## 2025-06-02 PROCEDURE — 85014 HEMATOCRIT: CPT

## 2025-06-02 PROCEDURE — 84100 ASSAY OF PHOSPHORUS: CPT

## 2025-06-02 PROCEDURE — 71275 CT ANGIOGRAPHY CHEST: CPT

## 2025-06-02 PROCEDURE — 80048 BASIC METABOLIC PNL TOTAL CA: CPT

## 2025-06-02 PROCEDURE — 36415 COLL VENOUS BLD VENIPUNCTURE: CPT

## 2025-06-02 PROCEDURE — 82435 ASSAY OF BLOOD CHLORIDE: CPT

## 2025-06-02 PROCEDURE — 93005 ELECTROCARDIOGRAM TRACING: CPT

## 2025-06-02 PROCEDURE — 82553 CREATINE MB FRACTION: CPT

## 2025-06-02 PROCEDURE — 93356 MYOCRD STRAIN IMG SPCKL TRCK: CPT

## 2025-06-02 PROCEDURE — 94640 AIRWAY INHALATION TREATMENT: CPT

## 2025-06-02 PROCEDURE — 84132 ASSAY OF SERUM POTASSIUM: CPT

## 2025-06-02 PROCEDURE — 84484 ASSAY OF TROPONIN QUANT: CPT

## 2025-06-02 PROCEDURE — 83880 ASSAY OF NATRIURETIC PEPTIDE: CPT

## 2025-06-02 PROCEDURE — 84295 ASSAY OF SERUM SODIUM: CPT

## 2025-06-02 PROCEDURE — 70553 MRI BRAIN STEM W/O & W/DYE: CPT

## 2025-06-02 PROCEDURE — 97162 PT EVAL MOD COMPLEX 30 MIN: CPT

## 2025-06-02 PROCEDURE — 71045 X-RAY EXAM CHEST 1 VIEW: CPT

## 2025-06-02 PROCEDURE — A9585: CPT

## 2025-06-02 PROCEDURE — 82947 ASSAY GLUCOSE BLOOD QUANT: CPT

## 2025-06-02 PROCEDURE — 93308 TTE F-UP OR LMTD: CPT

## 2025-06-02 PROCEDURE — 85027 COMPLETE CBC AUTOMATED: CPT

## 2025-06-02 PROCEDURE — 83605 ASSAY OF LACTIC ACID: CPT

## 2025-06-02 PROCEDURE — 85018 HEMOGLOBIN: CPT

## 2025-06-02 PROCEDURE — 93325 DOPPLER ECHO COLOR FLOW MAPG: CPT

## 2025-06-02 PROCEDURE — 85730 THROMBOPLASTIN TIME PARTIAL: CPT

## 2025-06-02 PROCEDURE — 80053 COMPREHEN METABOLIC PANEL: CPT

## 2025-06-02 PROCEDURE — 70450 CT HEAD/BRAIN W/O DYE: CPT

## 2025-06-02 PROCEDURE — 84443 ASSAY THYROID STIM HORMONE: CPT

## 2025-06-02 PROCEDURE — 82330 ASSAY OF CALCIUM: CPT

## 2025-06-02 PROCEDURE — 99285 EMERGENCY DEPT VISIT HI MDM: CPT

## 2025-06-02 PROCEDURE — 85652 RBC SED RATE AUTOMATED: CPT

## 2025-06-02 PROCEDURE — 86140 C-REACTIVE PROTEIN: CPT

## 2025-06-02 PROCEDURE — 82803 BLOOD GASES ANY COMBINATION: CPT

## 2025-06-02 PROCEDURE — 96372 THER/PROPH/DIAG INJ SC/IM: CPT

## 2025-06-02 PROCEDURE — 85025 COMPLETE CBC W/AUTO DIFF WBC: CPT

## 2025-06-02 PROCEDURE — 83735 ASSAY OF MAGNESIUM: CPT

## 2025-06-06 RX ORDER — METOPROLOL SUCCINATE 25 MG/1
25 TABLET, EXTENDED RELEASE ORAL
Qty: 90 | Refills: 1 | Status: ACTIVE | COMMUNITY
Start: 2025-05-27 | End: 1900-01-01

## 2025-06-06 RX ORDER — ASPIRIN 81 MG/1
81 TABLET, DELAYED RELEASE ORAL DAILY
Qty: 90 | Refills: 1 | Status: ACTIVE | COMMUNITY
Start: 2025-05-27 | End: 1900-01-01

## 2025-08-12 ENCOUNTER — APPOINTMENT (OUTPATIENT)
Dept: UROLOGY | Facility: CLINIC | Age: 85
End: 2025-08-12
Payer: MEDICARE

## 2025-08-12 VITALS
SYSTOLIC BLOOD PRESSURE: 145 MMHG | OXYGEN SATURATION: 94 % | TEMPERATURE: 97.7 F | HEART RATE: 86 BPM | DIASTOLIC BLOOD PRESSURE: 76 MMHG | RESPIRATION RATE: 16 BRPM

## 2025-08-12 VITALS
HEART RATE: 82 BPM | HEIGHT: 68 IN | OXYGEN SATURATION: 89 % | BODY MASS INDEX: 25.01 KG/M2 | WEIGHT: 165 LBS | TEMPERATURE: 97.7 F | SYSTOLIC BLOOD PRESSURE: 145 MMHG | DIASTOLIC BLOOD PRESSURE: 74 MMHG

## 2025-08-12 DIAGNOSIS — R47.01 APHASIA: ICD-10-CM

## 2025-08-12 DIAGNOSIS — N13.30 UNSPECIFIED HYDRONEPHROSIS: ICD-10-CM

## 2025-08-12 DIAGNOSIS — J43.9 EMPHYSEMA, UNSPECIFIED: ICD-10-CM

## 2025-08-12 DIAGNOSIS — R33.9 RETENTION OF URINE, UNSPECIFIED: ICD-10-CM

## 2025-08-12 DIAGNOSIS — R31.0 GROSS HEMATURIA: ICD-10-CM

## 2025-08-12 DIAGNOSIS — R30.0 DYSURIA: ICD-10-CM

## 2025-08-12 DIAGNOSIS — I69.320 APHASIA FOLLOWING CEREBRAL INFARCTION: ICD-10-CM

## 2025-08-12 DIAGNOSIS — R35.1 NOCTURIA: ICD-10-CM

## 2025-08-12 LAB
BILIRUB UR QL STRIP: NORMAL
CLARITY UR: CLEAR
COLLECTION METHOD: NORMAL
GLUCOSE UR-MCNC: NORMAL
HCG UR QL: 1 EU/DL
HGB UR QL STRIP.AUTO: NORMAL
KETONES UR-MCNC: NORMAL
LEUKOCYTE ESTERASE UR QL STRIP: NORMAL
NITRITE UR QL STRIP: NORMAL
PH UR STRIP: 6
PROT UR STRIP-MCNC: 100
SP GR UR STRIP: 1.02

## 2025-08-12 PROCEDURE — 99204 OFFICE O/P NEW MOD 45 MIN: CPT | Mod: 25

## 2025-08-12 PROCEDURE — 51702 INSERT TEMP BLADDER CATH: CPT | Mod: 59

## 2025-08-12 PROCEDURE — 76775 US EXAM ABDO BACK WALL LIM: CPT

## 2025-08-12 RX ORDER — METOPROLOL SUCCINATE 25 MG/1
25 TABLET, EXTENDED RELEASE ORAL
Refills: 0 | Status: ACTIVE | COMMUNITY

## 2025-08-12 RX ORDER — FLUTICASONE PROPIONATE AND SALMETEROL 100; 50 UG/1; UG/1
100-50 POWDER RESPIRATORY (INHALATION)
Refills: 0 | Status: ACTIVE | COMMUNITY

## 2025-08-12 RX ORDER — ACETAMINOPHEN 325 MG/1
325 TABLET ORAL
Refills: 0 | Status: ACTIVE | COMMUNITY

## 2025-08-13 LAB
ALBUMIN SERPL ELPH-MCNC: 3.9 G/DL
ALP BLD-CCNC: 84 U/L
ALT SERPL-CCNC: 31 U/L
ANION GAP SERPL CALC-SCNC: 20 MMOL/L
ANION GAP SERPL CALC-SCNC: 22 MMOL/L
AST SERPL-CCNC: 24 U/L
BILIRUB SERPL-MCNC: 0.6 MG/DL
BUN SERPL-MCNC: 21 MG/DL
BUN SERPL-MCNC: 21 MG/DL
CALCIUM SERPL-MCNC: 9.4 MG/DL
CALCIUM SERPL-MCNC: 9.5 MG/DL
CHLORIDE SERPL-SCNC: 103 MMOL/L
CHLORIDE SERPL-SCNC: 103 MMOL/L
CO2 SERPL-SCNC: 19 MMOL/L
CO2 SERPL-SCNC: 21 MMOL/L
CREAT SERPL-MCNC: 0.48 MG/DL
CREAT SERPL-MCNC: 0.49 MG/DL
EGFRCR SERPLBLD CKD-EPI 2021: 100 ML/MIN/1.73M2
EGFRCR SERPLBLD CKD-EPI 2021: 99 ML/MIN/1.73M2
GLUCOSE SERPL-MCNC: 83 MG/DL
GLUCOSE SERPL-MCNC: 85 MG/DL
POTASSIUM SERPL-SCNC: 4.7 MMOL/L
POTASSIUM SERPL-SCNC: 4.7 MMOL/L
PROT SERPL-MCNC: 6.8 G/DL
SODIUM SERPL-SCNC: 143 MMOL/L
SODIUM SERPL-SCNC: 143 MMOL/L

## 2025-08-14 ENCOUNTER — APPOINTMENT (OUTPATIENT)
Dept: INTERNAL MEDICINE | Facility: CLINIC | Age: 85
End: 2025-08-14
Payer: MEDICARE

## 2025-08-14 VITALS
TEMPERATURE: 97.3 F | DIASTOLIC BLOOD PRESSURE: 70 MMHG | BODY MASS INDEX: 23.49 KG/M2 | SYSTOLIC BLOOD PRESSURE: 134 MMHG | RESPIRATION RATE: 17 BRPM | HEART RATE: 78 BPM | HEIGHT: 68 IN | OXYGEN SATURATION: 91 % | WEIGHT: 155 LBS

## 2025-08-14 DIAGNOSIS — E03.9 HYPOTHYROIDISM, UNSPECIFIED: ICD-10-CM

## 2025-08-14 DIAGNOSIS — Z09 ENCOUNTER FOR FOLLOW-UP EXAMINATION AFTER COMPLETED TREATMENT FOR CONDITIONS OTHER THAN MALIGNANT NEOPLASM: ICD-10-CM

## 2025-08-14 DIAGNOSIS — J44.9 CHRONIC OBSTRUCTIVE PULMONARY DISEASE, UNSPECIFIED: ICD-10-CM

## 2025-08-14 DIAGNOSIS — I10 ESSENTIAL (PRIMARY) HYPERTENSION: ICD-10-CM

## 2025-08-14 PROCEDURE — 99496 TRANSJ CARE MGMT HIGH F2F 7D: CPT

## 2025-08-14 RX ORDER — LOSARTAN POTASSIUM 25 MG/1
25 TABLET, FILM COATED ORAL
Refills: 0 | Status: ACTIVE | COMMUNITY

## 2025-08-28 ENCOUNTER — APPOINTMENT (OUTPATIENT)
Dept: UROLOGY | Facility: CLINIC | Age: 85
End: 2025-08-28
Payer: MEDICARE

## 2025-08-28 VITALS
BODY MASS INDEX: 23.49 KG/M2 | SYSTOLIC BLOOD PRESSURE: 126 MMHG | HEIGHT: 68 IN | DIASTOLIC BLOOD PRESSURE: 67 MMHG | HEART RATE: 62 BPM | WEIGHT: 155 LBS | OXYGEN SATURATION: 90 %

## 2025-08-28 DIAGNOSIS — R33.9 RETENTION OF URINE, UNSPECIFIED: ICD-10-CM

## 2025-08-28 DIAGNOSIS — R31.0 GROSS HEMATURIA: ICD-10-CM

## 2025-08-28 PROCEDURE — 99214 OFFICE O/P EST MOD 30 MIN: CPT

## 2025-09-10 ENCOUNTER — NON-APPOINTMENT (OUTPATIENT)
Age: 85
End: 2025-09-10

## 2025-09-10 ENCOUNTER — APPOINTMENT (OUTPATIENT)
Dept: UROLOGY | Facility: CLINIC | Age: 85
End: 2025-09-10
Payer: MEDICARE

## 2025-09-10 VITALS — OXYGEN SATURATION: 91 % | SYSTOLIC BLOOD PRESSURE: 137 MMHG | DIASTOLIC BLOOD PRESSURE: 64 MMHG | HEART RATE: 80 BPM

## 2025-09-10 DIAGNOSIS — R33.9 RETENTION OF URINE, UNSPECIFIED: ICD-10-CM

## 2025-09-10 PROCEDURE — 51702 INSERT TEMP BLADDER CATH: CPT

## 2025-09-17 ENCOUNTER — APPOINTMENT (OUTPATIENT)
Dept: UROLOGY | Facility: CLINIC | Age: 85
End: 2025-09-17

## 2025-09-18 ENCOUNTER — APPOINTMENT (OUTPATIENT)
Dept: CARDIOLOGY | Facility: CLINIC | Age: 85
End: 2025-09-18
Payer: MEDICARE

## 2025-09-18 ENCOUNTER — NON-APPOINTMENT (OUTPATIENT)
Age: 85
End: 2025-09-18

## 2025-09-18 ENCOUNTER — APPOINTMENT (OUTPATIENT)
Dept: INTERNAL MEDICINE | Facility: CLINIC | Age: 85
End: 2025-09-18
Payer: MEDICARE

## 2025-09-18 VITALS
BODY MASS INDEX: 24.25 KG/M2 | WEIGHT: 160 LBS | RESPIRATION RATE: 17 BRPM | TEMPERATURE: 98.2 F | HEART RATE: 76 BPM | SYSTOLIC BLOOD PRESSURE: 148 MMHG | HEIGHT: 68 IN | OXYGEN SATURATION: 88 % | DIASTOLIC BLOOD PRESSURE: 57 MMHG

## 2025-09-18 VITALS — DIASTOLIC BLOOD PRESSURE: 82 MMHG | SYSTOLIC BLOOD PRESSURE: 124 MMHG

## 2025-09-18 DIAGNOSIS — Z86.79 PERSONAL HISTORY OF OTHER DISEASES OF THE CIRCULATORY SYSTEM: ICD-10-CM

## 2025-09-18 DIAGNOSIS — G40.909 EPILEPSY, UNSPECIFIED, NOT INTRACTABLE, W/OUT STATUS EPILEPTICUS: ICD-10-CM

## 2025-09-18 DIAGNOSIS — J44.9 CHRONIC OBSTRUCTIVE PULMONARY DISEASE, UNSPECIFIED: ICD-10-CM

## 2025-09-18 DIAGNOSIS — E03.9 HYPOTHYROIDISM, UNSPECIFIED: ICD-10-CM

## 2025-09-18 DIAGNOSIS — R06.09 OTHER FORMS OF DYSPNEA: ICD-10-CM

## 2025-09-18 PROBLEM — R60.0 LOWER EXTREMITY EDEMA: Status: ACTIVE | Noted: 2025-09-18

## 2025-09-18 PROCEDURE — G2211 COMPLEX E/M VISIT ADD ON: CPT

## 2025-09-18 PROCEDURE — 99213 OFFICE O/P EST LOW 20 MIN: CPT

## 2025-09-18 PROCEDURE — 93971 EXTREMITY STUDY: CPT

## 2025-09-18 PROCEDURE — 93000 ELECTROCARDIOGRAM COMPLETE: CPT

## 2025-09-18 PROCEDURE — 99214 OFFICE O/P EST MOD 30 MIN: CPT

## 2025-09-18 RX ORDER — SPIRONOLACTONE 25 MG/1
25 TABLET ORAL DAILY
Qty: 90 | Refills: 3 | Status: ACTIVE | COMMUNITY
Start: 2025-09-18 | End: 1900-01-01

## 2025-09-18 RX ORDER — FUROSEMIDE 40 MG/1
40 TABLET ORAL TWICE DAILY
Qty: 180 | Refills: 2 | Status: ACTIVE | COMMUNITY
Start: 2025-09-18 | End: 1900-01-01

## 2025-09-18 RX ORDER — ASPIRIN 81 MG/1
81 TABLET, DELAYED RELEASE ORAL
Refills: 0 | Status: ACTIVE | COMMUNITY
Start: 2025-09-18

## 2025-09-19 PROBLEM — Z86.79 HISTORY OF SUBDURAL HEMORRHAGE: Status: RESOLVED | Noted: 2019-05-01 | Resolved: 2025-09-19
